# Patient Record
Sex: FEMALE | Race: BLACK OR AFRICAN AMERICAN
[De-identification: names, ages, dates, MRNs, and addresses within clinical notes are randomized per-mention and may not be internally consistent; named-entity substitution may affect disease eponyms.]

---

## 2020-01-21 ENCOUNTER — TRANSCRIPTION ENCOUNTER (OUTPATIENT)
Age: 50
End: 2020-01-21

## 2020-01-21 ENCOUNTER — EMERGENCY (EMERGENCY)
Facility: HOSPITAL | Age: 50
LOS: 1 days | Discharge: LEFT BEFORE DISCHARGE | End: 2020-01-21
Attending: EMERGENCY MEDICINE | Admitting: HOSPITALIST
Payer: COMMERCIAL

## 2020-01-21 VITALS
HEART RATE: 90 BPM | DIASTOLIC BLOOD PRESSURE: 77 MMHG | OXYGEN SATURATION: 100 % | RESPIRATION RATE: 16 BRPM | SYSTOLIC BLOOD PRESSURE: 129 MMHG | TEMPERATURE: 100 F

## 2020-01-21 VITALS
HEART RATE: 128 BPM | RESPIRATION RATE: 18 BRPM | TEMPERATURE: 100 F | DIASTOLIC BLOOD PRESSURE: 80 MMHG | SYSTOLIC BLOOD PRESSURE: 124 MMHG | OXYGEN SATURATION: 100 %

## 2020-01-21 DIAGNOSIS — Z29.9 ENCOUNTER FOR PROPHYLACTIC MEASURES, UNSPECIFIED: ICD-10-CM

## 2020-01-21 DIAGNOSIS — D32.9 BENIGN NEOPLASM OF MENINGES, UNSPECIFIED: ICD-10-CM

## 2020-01-21 DIAGNOSIS — B00.9 HERPESVIRAL INFECTION, UNSPECIFIED: ICD-10-CM

## 2020-01-21 DIAGNOSIS — R56.9 UNSPECIFIED CONVULSIONS: ICD-10-CM

## 2020-01-21 DIAGNOSIS — R65.10 SYSTEMIC INFLAMMATORY RESPONSE SYNDROME (SIRS) OF NON-INFECTIOUS ORIGIN WITHOUT ACUTE ORGAN DYSFUNCTION: ICD-10-CM

## 2020-01-21 DIAGNOSIS — Z02.9 ENCOUNTER FOR ADMINISTRATIVE EXAMINATIONS, UNSPECIFIED: ICD-10-CM

## 2020-01-21 LAB
ALBUMIN SERPL ELPH-MCNC: 4 G/DL — SIGNIFICANT CHANGE UP (ref 3.3–5)
ALP SERPL-CCNC: 55 U/L — SIGNIFICANT CHANGE UP (ref 40–120)
ALT FLD-CCNC: 13 U/L — SIGNIFICANT CHANGE UP (ref 4–33)
ANION GAP SERPL CALC-SCNC: 9 MMO/L — SIGNIFICANT CHANGE UP (ref 7–14)
APPEARANCE UR: CLEAR — SIGNIFICANT CHANGE UP
AST SERPL-CCNC: 22 U/L — SIGNIFICANT CHANGE UP (ref 4–32)
B PERT DNA SPEC QL NAA+PROBE: NOT DETECTED — SIGNIFICANT CHANGE UP
BACTERIA # UR AUTO: NEGATIVE — SIGNIFICANT CHANGE UP
BASE EXCESS BLDV CALC-SCNC: 1.5 MMOL/L — SIGNIFICANT CHANGE UP
BASOPHILS # BLD AUTO: 0.07 K/UL — SIGNIFICANT CHANGE UP (ref 0–0.2)
BASOPHILS NFR BLD AUTO: 0.8 % — SIGNIFICANT CHANGE UP (ref 0–2)
BILIRUB SERPL-MCNC: 0.3 MG/DL — SIGNIFICANT CHANGE UP (ref 0.2–1.2)
BILIRUB UR-MCNC: NEGATIVE — SIGNIFICANT CHANGE UP
BLOOD GAS VENOUS - CREATININE: 0.89 MG/DL — SIGNIFICANT CHANGE UP (ref 0.5–1.3)
BLOOD GAS VENOUS - FIO2: 21 — SIGNIFICANT CHANGE UP
BLOOD UR QL VISUAL: HIGH
BUN SERPL-MCNC: 10 MG/DL — SIGNIFICANT CHANGE UP (ref 7–23)
C PNEUM DNA SPEC QL NAA+PROBE: NOT DETECTED — SIGNIFICANT CHANGE UP
CALCIUM SERPL-MCNC: 9 MG/DL — SIGNIFICANT CHANGE UP (ref 8.4–10.5)
CHLORIDE BLDV-SCNC: 108 MMOL/L — SIGNIFICANT CHANGE UP (ref 96–108)
CHLORIDE SERPL-SCNC: 106 MMOL/L — SIGNIFICANT CHANGE UP (ref 98–107)
CO2 SERPL-SCNC: 24 MMOL/L — SIGNIFICANT CHANGE UP (ref 22–31)
COLOR SPEC: SIGNIFICANT CHANGE UP
CREAT SERPL-MCNC: 0.87 MG/DL — SIGNIFICANT CHANGE UP (ref 0.5–1.3)
EOSINOPHIL # BLD AUTO: 0.06 K/UL — SIGNIFICANT CHANGE UP (ref 0–0.5)
EOSINOPHIL NFR BLD AUTO: 0.7 % — SIGNIFICANT CHANGE UP (ref 0–6)
FLUAV H1 2009 PAND RNA SPEC QL NAA+PROBE: NOT DETECTED — SIGNIFICANT CHANGE UP
FLUAV H1 RNA SPEC QL NAA+PROBE: NOT DETECTED — SIGNIFICANT CHANGE UP
FLUAV H3 RNA SPEC QL NAA+PROBE: NOT DETECTED — SIGNIFICANT CHANGE UP
FLUAV SUBTYP SPEC NAA+PROBE: NOT DETECTED — SIGNIFICANT CHANGE UP
FLUBV RNA SPEC QL NAA+PROBE: NOT DETECTED — SIGNIFICANT CHANGE UP
GAS PNL BLDV: 139 MMOL/L — SIGNIFICANT CHANGE UP (ref 136–146)
GLUCOSE BLDV-MCNC: 97 MG/DL — SIGNIFICANT CHANGE UP (ref 70–99)
GLUCOSE SERPL-MCNC: 102 MG/DL — HIGH (ref 70–99)
GLUCOSE UR-MCNC: NEGATIVE — SIGNIFICANT CHANGE UP
HADV DNA SPEC QL NAA+PROBE: NOT DETECTED — SIGNIFICANT CHANGE UP
HCO3 BLDV-SCNC: 24 MMOL/L — SIGNIFICANT CHANGE UP (ref 20–27)
HCOV PNL SPEC NAA+PROBE: SIGNIFICANT CHANGE UP
HCT VFR BLD CALC: 38.4 % — SIGNIFICANT CHANGE UP (ref 34.5–45)
HCT VFR BLDV CALC: 40 % — SIGNIFICANT CHANGE UP (ref 34.5–45)
HGB BLD-MCNC: 12.5 G/DL — SIGNIFICANT CHANGE UP (ref 11.5–15.5)
HGB BLDV-MCNC: 13 G/DL — SIGNIFICANT CHANGE UP (ref 11.5–15.5)
HMPV RNA SPEC QL NAA+PROBE: NOT DETECTED — SIGNIFICANT CHANGE UP
HPIV1 RNA SPEC QL NAA+PROBE: NOT DETECTED — SIGNIFICANT CHANGE UP
HPIV2 RNA SPEC QL NAA+PROBE: NOT DETECTED — SIGNIFICANT CHANGE UP
HPIV3 RNA SPEC QL NAA+PROBE: NOT DETECTED — SIGNIFICANT CHANGE UP
HPIV4 RNA SPEC QL NAA+PROBE: NOT DETECTED — SIGNIFICANT CHANGE UP
IMM GRANULOCYTES NFR BLD AUTO: 0.3 % — SIGNIFICANT CHANGE UP (ref 0–1.5)
KETONES UR-MCNC: SIGNIFICANT CHANGE UP
LACTATE BLDV-MCNC: 1.5 MMOL/L — SIGNIFICANT CHANGE UP (ref 0.5–2)
LEUKOCYTE ESTERASE UR-ACNC: NEGATIVE — SIGNIFICANT CHANGE UP
LYMPHOCYTES # BLD AUTO: 0.62 K/UL — LOW (ref 1–3.3)
LYMPHOCYTES # BLD AUTO: 7.2 % — LOW (ref 13–44)
MCHC RBC-ENTMCNC: 29.8 PG — SIGNIFICANT CHANGE UP (ref 27–34)
MCHC RBC-ENTMCNC: 32.6 % — SIGNIFICANT CHANGE UP (ref 32–36)
MCV RBC AUTO: 91.6 FL — SIGNIFICANT CHANGE UP (ref 80–100)
MONOCYTES # BLD AUTO: 0.57 K/UL — SIGNIFICANT CHANGE UP (ref 0–0.9)
MONOCYTES NFR BLD AUTO: 6.6 % — SIGNIFICANT CHANGE UP (ref 2–14)
NEUTROPHILS # BLD AUTO: 7.23 K/UL — SIGNIFICANT CHANGE UP (ref 1.8–7.4)
NEUTROPHILS NFR BLD AUTO: 84.4 % — HIGH (ref 43–77)
NITRITE UR-MCNC: NEGATIVE — SIGNIFICANT CHANGE UP
NRBC # FLD: 0 K/UL — SIGNIFICANT CHANGE UP (ref 0–0)
PCO2 BLDV: 48 MMHG — SIGNIFICANT CHANGE UP (ref 41–51)
PH BLDV: 7.36 PH — SIGNIFICANT CHANGE UP (ref 7.32–7.43)
PH UR: 6 — SIGNIFICANT CHANGE UP (ref 5–8)
PLATELET # BLD AUTO: 271 K/UL — SIGNIFICANT CHANGE UP (ref 150–400)
PMV BLD: 9.7 FL — SIGNIFICANT CHANGE UP (ref 7–13)
PO2 BLDV: < 24 MMHG — LOW (ref 35–40)
POTASSIUM BLDV-SCNC: 3.3 MMOL/L — LOW (ref 3.4–4.5)
POTASSIUM SERPL-MCNC: 4 MMOL/L — SIGNIFICANT CHANGE UP (ref 3.5–5.3)
POTASSIUM SERPL-SCNC: 4 MMOL/L — SIGNIFICANT CHANGE UP (ref 3.5–5.3)
PROT SERPL-MCNC: 6.8 G/DL — SIGNIFICANT CHANGE UP (ref 6–8.3)
PROT UR-MCNC: 30 — SIGNIFICANT CHANGE UP
RBC # BLD: 4.19 M/UL — SIGNIFICANT CHANGE UP (ref 3.8–5.2)
RBC # FLD: 13.6 % — SIGNIFICANT CHANGE UP (ref 10.3–14.5)
RBC CASTS # UR COMP ASSIST: SIGNIFICANT CHANGE UP (ref 0–?)
RSV RNA SPEC QL NAA+PROBE: NOT DETECTED — SIGNIFICANT CHANGE UP
RV+EV RNA SPEC QL NAA+PROBE: NOT DETECTED — SIGNIFICANT CHANGE UP
SAO2 % BLDV: 22.6 % — LOW (ref 60–85)
SODIUM SERPL-SCNC: 139 MMOL/L — SIGNIFICANT CHANGE UP (ref 135–145)
SP GR SPEC: 1.02 — SIGNIFICANT CHANGE UP (ref 1–1.04)
SQUAMOUS # UR AUTO: SIGNIFICANT CHANGE UP
TROPONIN T, HIGH SENSITIVITY: 11 NG/L — SIGNIFICANT CHANGE UP (ref ?–14)
UROBILINOGEN FLD QL: NORMAL — SIGNIFICANT CHANGE UP
WBC # BLD: 8.58 K/UL — SIGNIFICANT CHANGE UP (ref 3.8–10.5)
WBC # FLD AUTO: 8.58 K/UL — SIGNIFICANT CHANGE UP (ref 3.8–10.5)
WBC UR QL: HIGH (ref 0–?)

## 2020-01-21 PROCEDURE — 99285 EMERGENCY DEPT VISIT HI MDM: CPT

## 2020-01-21 RX ORDER — ACYCLOVIR SODIUM 500 MG
700 VIAL (EA) INTRAVENOUS ONCE
Refills: 0 | Status: COMPLETED | OUTPATIENT
Start: 2020-01-21 | End: 2020-01-21

## 2020-01-21 RX ORDER — SODIUM CHLORIDE 9 MG/ML
1000 INJECTION, SOLUTION INTRAVENOUS
Refills: 0 | Status: DISCONTINUED | OUTPATIENT
Start: 2020-01-21 | End: 2020-01-21

## 2020-01-21 RX ORDER — MORPHINE SULFATE 50 MG/1
2 CAPSULE, EXTENDED RELEASE ORAL ONCE
Refills: 0 | Status: DISCONTINUED | OUTPATIENT
Start: 2020-01-21 | End: 2020-01-21

## 2020-01-21 RX ORDER — ACETAMINOPHEN 500 MG
650 TABLET ORAL EVERY 6 HOURS
Refills: 0 | Status: DISCONTINUED | OUTPATIENT
Start: 2020-01-21 | End: 2020-01-21

## 2020-01-21 RX ORDER — ACYCLOVIR SODIUM 500 MG
850 VIAL (EA) INTRAVENOUS EVERY 8 HOURS
Refills: 0 | Status: DISCONTINUED | OUTPATIENT
Start: 2020-01-21 | End: 2020-01-21

## 2020-01-21 RX ORDER — ACYCLOVIR SODIUM 500 MG
700 VIAL (EA) INTRAVENOUS EVERY 8 HOURS
Refills: 0 | Status: DISCONTINUED | OUTPATIENT
Start: 2020-01-21 | End: 2020-01-21

## 2020-01-21 RX ORDER — ACYCLOVIR SODIUM 500 MG
100 VIAL (EA) INTRAVENOUS ONCE
Refills: 0 | Status: DISCONTINUED | OUTPATIENT
Start: 2020-01-21 | End: 2020-01-21

## 2020-01-21 RX ORDER — SODIUM CHLORIDE 9 MG/ML
1000 INJECTION, SOLUTION INTRAVENOUS ONCE
Refills: 0 | Status: COMPLETED | OUTPATIENT
Start: 2020-01-21 | End: 2020-01-21

## 2020-01-21 RX ORDER — LEVETIRACETAM 250 MG/1
500 TABLET, FILM COATED ORAL
Refills: 0 | Status: DISCONTINUED | OUTPATIENT
Start: 2020-01-21 | End: 2020-01-21

## 2020-01-21 RX ADMIN — Medication 650 MILLIGRAM(S): at 10:00

## 2020-01-21 RX ADMIN — MORPHINE SULFATE 2 MILLIGRAM(S): 50 CAPSULE, EXTENDED RELEASE ORAL at 06:20

## 2020-01-21 RX ADMIN — LEVETIRACETAM 500 MILLIGRAM(S): 250 TABLET, FILM COATED ORAL at 19:24

## 2020-01-21 RX ADMIN — SODIUM CHLORIDE 100 MILLILITER(S): 9 INJECTION, SOLUTION INTRAVENOUS at 19:24

## 2020-01-21 RX ADMIN — SODIUM CHLORIDE 100 MILLILITER(S): 9 INJECTION, SOLUTION INTRAVENOUS at 12:19

## 2020-01-21 RX ADMIN — Medication 650 MILLIGRAM(S): at 17:05

## 2020-01-21 RX ADMIN — SODIUM CHLORIDE 1000 MILLILITER(S): 9 INJECTION, SOLUTION INTRAVENOUS at 05:35

## 2020-01-21 RX ADMIN — SODIUM CHLORIDE 1000 MILLILITER(S): 9 INJECTION, SOLUTION INTRAVENOUS at 06:20

## 2020-01-21 RX ADMIN — Medication 176 MILLIGRAM(S): at 19:24

## 2020-01-21 RX ADMIN — Medication 650 MILLIGRAM(S): at 11:30

## 2020-01-21 RX ADMIN — Medication 650 MILLIGRAM(S): at 16:06

## 2020-01-21 RX ADMIN — Medication 264 MILLIGRAM(S): at 10:01

## 2020-01-21 NOTE — H&P ADULT - HISTORY OF PRESENT ILLNESS
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 Patient states that she was speaking with her daughter when she experience an episode of dysarthria followed by LOC. The event was witnessed by er daughter who stated that upon LOC patient experienced whole body shaking which lasted about 2 minutes. Patient was brought to ER after this event. Patient has had no prior episodes of seizure. Of note, patient's meningioma was resected in OCT 2018 (by Dr. Mcdaniel of [facility]) and was told that 5% was remaining. Patient has had annual MRIs since and was told that her most recent in July 2019 was normal. Additionally, patient has been experiencing a sore throat since Monday night associated w/ fatigue. +recent travel to [place] in Nov. Patient does not report f/c, n/v, headaches, b/v, neck stiffness, photophobia, sick contacts. 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 Patient states that she was speaking with her daughter when she experience an episode of dysarthria followed by LOC. The event was witnessed by er daughter who stated that upon LOC patient experienced whole body shaking which lasted about 2 minutes. Patient was brought to ER after this event. Patient has had no prior episodes of seizure. Of note, patient's meningioma was resected in OCT 2018 (by Dr. Mcdaniel of [facility]) and was told that 5% was remaining. Patient has had annual MRIs since and was told that her most recent in July 2019 was normal. Additionally, patient has been experiencing a sore throat since Monday night associated w/ headache fatigue. +recent travel to [place] in Nov. Patient does not report f/c, n/v, headaches, b/v, neck stiffness, photophobia, sick contacts.

## 2020-01-21 NOTE — CONSULT NOTE ADULT - ATTENDING COMMENTS
49 year old female with reported history of HSV, prior meningoma resection, presents with seizure.  Found to be febrile in the ED.     At this time, she is non- toxic appearing.     There are two possibilities I am concerned about.  The patient may have a focus for seizure (CNS lesion noted on imaging) that provoked seizure and pt had fever in the post ictal period.  Given her current mental state, a and o times three- this seems more likely.     But seizure with fever does raise concern for CNS infection- including HSV.   I think this is less likely , but the danger of menigitis/ HSV encephalitis is significant.    I would recommend continuing acyclovir with close monitoring of Cr/ IV fluids.     I would pursue an LP with cell count , glucose, protein, routine culture, CSF PCR panel, HSV 1/2 PCR.

## 2020-01-21 NOTE — H&P ADULT - PROBLEM SELECTOR PLAN 1
1st episode of seizure x 2min likely due to progressing meningioma v  Neuro c/s, recs appreciated;   -MRI Brain w/ & w/o contrast  -24hr vEEG  -Keppra 500mg BID  -c/w acyclovir  - f/u HIV, blood cx, Ucx  - LP 1st episode of seizure x 2min likely due to progressing meningioma v  Neuro c/s, recs appreciated;   -MRI Brain w/ & w/o contrast  -24hr vEEG  -Keppra 500mg BID  -c/w acyclovir  - f/u HIV, blood cx, Ucx  -LP if patient amenable

## 2020-01-21 NOTE — H&P ADULT - NSHPLABSRESULTS_GEN_ALL_CORE
.  LABS:                         12.5   8.58  )-----------( 271      ( 2020 03:14 )             38.4     -    139  |  106  |  10  ----------------------------<  102<H>  4.0   |  24  |  0.87    Ca    9.0      2020 03:14    TPro  6.8  /  Alb  4.0  /  TBili  0.3  /  DBili  x   /  AST  22  /  ALT  13  /  AlkPhos  55        Urinalysis Basic - ( 2020 04:30 )    Color: LIGHT YELLOW / Appearance: CLEAR / S.020 / pH: 6.0  Gluc: NEGATIVE / Ketone: TRACE  / Bili: NEGATIVE / Urobili: NORMAL   Blood: MODERATE / Protein: 30 / Nitrite: NEGATIVE   Leuk Esterase: NEGATIVE / RBC: 30-50 / WBC 11-25   Sq Epi: OCC / Non Sq Epi: x / Bacteria: NEGATIVE        CT Head:     IMPRESSION:   A left parietal meningioma measures 3.8 x 2.3 x 5 cm.  There is small amount of edema in the adjacent left parietal convexity.  No evidence of acute intracranial hemorrhage, midline shift, hydrocephalus or acute territorial infarct.    Follow-up MRI may be obtained for further evaluation.

## 2020-01-21 NOTE — ED ADULT NURSE REASSESSMENT NOTE - NS ED NURSE REASSESS COMMENT FT1
Pt requesting to leave against medical advice at this time. team 3 called and made aware. Will continue to monitor.

## 2020-01-21 NOTE — ED PROVIDER NOTE - PHYSICAL EXAMINATION
GENERAL: no acute distress, non-toxic appearing  HEAD: normocephalic, atraumatic  HEENT: normal conjunctiva, oral mucosa moist, neck supple, tongue abrasions lateral aspects of tongue not currently bleeding  CARDIAC: regular rate and rhythm, normal S1 and S2,  no appreciable murmurs, 2+ pulses all extremities  PULM: clear to ascultation bilaterally, no crackles, rales, rhonchi, or wheezing  GI: abdomen nondistended, soft, nontender, no guarding or rebound tenderness  : no CVA tenderness, no suprapubic tenderness  NEURO: alert and oriented x 3, normal speech, CN3-12 intact, PERRL, EOMI, no pronator drift, finger to nose intact, no focal motor or sensory deficits, nonantalgic gait  MSK: no visible deformities, no peripheral edema, calf tenderness/redness/swelling  SKIN: no visible rashes, dry, well-perfused  PSYCH: appropriate mood and affect

## 2020-01-21 NOTE — H&P ADULT - PROBLEM SELECTOR PLAN 2
obtain previous scans from [place] Menigioma partially resected in 2018 w/ 3.8 x 2.3 x 5 cm L parietal lobe mengioma seen no 1/21 CTH  - Will obtain records from [facility]  - neurosurg c/s  - poss transfer to [facility] for tx from patients neurosurgeon Dr. Darrell Bolton

## 2020-01-21 NOTE — DISCHARGE NOTE PROVIDER - HOSPITAL COURSE
48 y/o F PMH w/ Mhx of meningioma s/p resection in 2018 w/ repeat CTH showing left parietal meningioma p/w seizure likely due to increasing size of meningioma v viral encephalitis v aseptic meningitis. CT head was performed in the ED showing a left parietal meningioma. As per patient, she gets routine MRIs to monitor mass and according to patient, the last MRI was "normal." An LP was performed in the ED as well but was unsuccessful. An attempt was made to contact neurosurgeon Dr. Mcdaniel of [facility] [facility], but there was no answer. Patient requesting to be transferred to [place] which was being put in place. However, patient decided to leave [place] due to the wait time for transfer.

## 2020-01-21 NOTE — PHARMACOTHERAPY INTERVENTION NOTE - COMMENTS
CUCAROSA JENSEN, 49y Female being treated empirically for possible viral encephalitis/meningitis, on acyclovir IV.    Recommendation(s):  1) Patient is overweight (BMI is 29.9), would recommend changing dose of acyclovir based on adjusted body weight (70 kg), given 10 mg/kg Q8H (700 mg IV Q8H).  2) Ensure patient is receiving adequate fluids as acyclovir can cause nephrotoxicity without fluid administration.     [place], JacquesD  PGY-2 Infectious Diseases Pharmacy Resident  Spectra [phone]  .

## 2020-01-21 NOTE — ED PROVIDER NOTE - ATTENDING CONTRIBUTION TO CARE
I performed a face to face bedside interview with patient regarding history of present illness, review of symptoms and past medical history. I completed an independent physical exam.  I have discussed patient's plan of care.   I agree with note as stated above, having amended the EMR as needed to reflect my findings. I have discussed the assessment and plan of care.  This includes during the time I functioned as the attending physician for this patient.  Attending Contribution to Care: agree with plan of resident. 49F PMH benign brain tumor s/p resection 2018 presenting to the ED with family after a witnessed approx. 2 minute episode of generalized shaking, along with saliva coming from pts mouth as she was laying down in bed at approx 12am this evening. Pt states this has never happened to her before. Daughter of patient also states that pt came to.  pt p/w headache, 1 episode of seizure like activity witnessed by family with post ictal phase, with fever 101.6 here. pt well appearing, overall with no nuchal rigidity. difficult to access viral vs bacterial, most likely viral. pending LP. I performed a face to face bedside interview with patient regarding history of present illness, review of symptoms and past medical history. I completed an independent physical exam.  I have discussed patient's plan of care.   I agree with note as stated above, having amended the EMR as needed to reflect my findings. I have discussed the assessment and plan of care.  This includes during the time I functioned as the attending physician for this patient.  Attending Contribution to Care: agree with plan of resident. 49F PMH benign brain tumor s/p resection 2018 presenting to the ED with family after a witnessed approx. 2 minute episode of generalized shaking, along with saliva coming from pts mouth as she was laying down in bed at approx 12am this evening. Pt states this has never happened to her before. Daughter of patient also states that pt came to.  pt p/w headache, 1 episode of seizure like activity witnessed by family with post ictal phase, with fever 101.6 here. pt well appearing, overall with no nuchal rigidity. difficult to access viral vs bacterial, most likely viral. pending LP. stable at this point

## 2020-01-21 NOTE — ED ADULT NURSE REASSESSMENT NOTE - NS ED NURSE REASSESS COMMENT FT1
Pt requesting to be transferred to [facility] where her neurosurgeon doctor is associated. Team 3 made aware and given number for the doctors office. Will continue to monitor.

## 2020-01-21 NOTE — ED ADULT TRIAGE NOTE - CHIEF COMPLAINT QUOTE
PT is postictal from home were she had a witnessed seizure that was about 2-3 minutes in length. PT is confused and has HX of brain tumor surgery in OCT. of 2018. PT appears confortable and aware

## 2020-01-21 NOTE — DISCHARGE NOTE PROVIDER - NSDCCPCAREPLAN_GEN_ALL_CORE_FT
PRINCIPAL DISCHARGE DIAGNOSIS  Diagnosis: Seizure  Assessment and Plan of Treatment: You have been seen and evaluated for seizure. A CT head was performed and showed a menigioma which is likely the cause of your seizure. However, there remains other causes of seizure including an infection of the fluid surrounding your brain due melody viral or bacterial organism. The infectious disease and neuro doctors have evaluated you and deemed that you need to be evaluated further with more lab tests including a lumbar puncture, but due to your wishes to be treated at [facility], you will be discharged to follow up with your neurosurgeon Dr. Alex at [place].      SECONDARY DISCHARGE DIAGNOSES  Diagnosis: Meningioma  Assessment and Plan of Treatment: A CT head was performed and showed a mengioma. Please make sure to follow up with your neurosurgeon Dr. Alex at [place].

## 2020-01-21 NOTE — H&P ADULT - NSHPPHYSICALEXAM_GEN_ALL_CORE
General appearance: NAD, conversant, afebrile    Eyes: anicteric sclerae, moist conjunctivae; no lid-lag; Pupils equal, round and reactive to light; Extraocular muscles intact   HENT: Atraumatic; oropharynx clear with moist mucous membranes    Neck: Trachea midline; Full range of motion, supple   Pulm: Lungs clear to auscultation bilaterally, with normal respiratory effort and no intercostal retractions; normal work of breathing   CV: Regular Rhythm and Rate; Normal S1, S2; No murmurs, rubs, or gallops   Abdomen: Soft, non-tender, non-distended   Extremities: No peripheral edema. 5/5 strength in all four extremities.  Neuro: CN II-XII grossly intact, sensation intact in all extremities, non focal   Psych: Appropriate affect, cooperative; alert and oriented to person, place and time

## 2020-01-21 NOTE — CONSULT NOTE ADULT - ASSESSMENT
50 y/o female with past medical history of Partial meningioma resection (2018) presents to the ED for reported seizure. Patient states she "did not feel well" on 1/20 and reported feeling URI symptoms (cough, sore throat). On 1/20 night, patient reportedly tried speaking to her daughter but was having difficulty getting out her words. Patient last remembers going to bed at 12am on 1/21 and then remembered waking up in the ambulance. Patient admits to having blood in her mouth at that time. As per EMR, patient had 2 minutes of generalized shaking accompanied with unresponsiveness and saliva coming out of her mouth while laying in bed followed by post-ictal confusion for about 1 hour. Patient denies any recent head trauma. No prior history of seizures and reports taking AED for 1 week when first diagnosed with meningioma in 10/2018. Patient reports she has been following with Doctors at [facility] since her diagnosis and recently had a stable MRI brain.   At the time of my interview, patient denies any changes in vision, ringing in the ears, double vision, unilateral weakness or sensory changes, problems with speech or coordination. Neurologic exam demonstrates +nuchal rigidity otherwise non-focal.   In the ED patient was noted to have rectal temperature of 101.2F w/o leukocytosis. UA grossly negative. RapidVP negative.   CTH demonstrating left parietal 3.8x2.3x5cm meningioma w/ small amount of edema adjacent to left parietal convexity.     Impression: Suspect generalized seizure 2/2 unknown etiology; ?expanding meningioma a/w edema may cause local irritation to brain parenchyma which may have triggered seizure; CNS infection (encephalitis) is a possibility given seizure, fever, and nuchal rigidity, though patient appears non-toxic on examination; would r/o alternate toxic/metabolic etiologies.      Recommendations:   [] MRI Brain w/ & w/o contrast  [] 24hr vEEG  [] Start Keppra 500mg BID  [] Agree with empiric treatment for HSV  [] HIV testing  [] Neurosurgery consultation  [] Infectious Disease consultation   [] Would defer LP until MRI; will decide if needed once imaging obtained  [] Obtain records from [facility] 50 y/o female with past medical history of Partial meningioma resection (2018) presents to the ED for reported seizure. Patient states she "did not feel well" on 1/20 and reported feeling URI symptoms (cough, sore throat). On 1/20 night, patient reportedly tried speaking to her daughter but was having difficulty getting out her words. Patient last remembers going to bed at 12am on 1/21 and then remembered waking up in the ambulance. Patient admits to having blood in her mouth at that time. As per EMR, patient had 2 minutes of generalized shaking accompanied with unresponsiveness and saliva coming out of her mouth while laying in bed followed by post-ictal confusion for about 1 hour. Patient denies any recent head trauma. No prior history of seizures and reports taking AED for 1 week when first diagnosed with meningioma in 10/2018. Patient reports she has been following with Doctors at [facility] since her diagnosis and recently had a stable MRI brain.   At the time of my interview, patient denies any changes in vision, ringing in the ears, double vision, unilateral weakness or sensory changes, problems with speech or coordination. Neurologic exam demonstrates +nuchal rigidity otherwise non-focal.   In the ED patient was noted to have rectal temperature of 101.2F w/o leukocytosis. UA grossly negative. RapidVP negative.   CTH demonstrating left parietal 3.8x2.3x5cm meningioma w/ small amount of edema adjacent to left parietal convexity.     Impression: Suspect generalized seizure 2/2 unknown etiology -- likely provoked though with high risk of recurrence warranting treatment; ?expanding meningioma a/w edema may cause local irritation to brain parenchyma which may have triggered seizure; CNS infection (encephalitis) is a possibility given seizure, fever, and nuchal rigidity, though patient appears non-toxic on examination; would r/o alternate toxic/metabolic etiologies.      Recommendations:   [] MRI Brain w/ & w/o contrast  [] 24hr vEEG  [] Start Keppra 500mg BID  [] Agree with empiric treatment for HSV  [] HIV testing  [] Neurosurgery consultation  [] Infectious Disease consultation   [] Would defer LP until MRI; will decide if needed once imaging obtained  [] Obtain records from [facility]

## 2020-01-21 NOTE — CONSULT NOTE ADULT - ASSESSMENT
48 y/o F PMH of HSV and meningioma s/p resection in 2018 w/ repeat CTH showing left parietal meningioma p/w seizure, ID consulted to evaluate for possible infectious cause, r/o viral encephalitis vs aseptic meningitis.    On the infectious differential for this patient's new onset seizures with fever, tachycardia, nuchal rigidity are: HSV encephalitis,     Recommend:  - c/w IV acyclovir with IVF hydration  - antiseizure with keppra per primary team/neurology, and vEEG  - f/u BCx, UCx  - HSV, VZV pcr, HIV test  - MRI brain to evaluate for encephalitis  - trend CBC with diff daily and temperatures 48 y/o F PMH of HSV and meningioma s/p resection in 2018 w/ repeat CTH showing left parietal meningioma p/w seizure, ID consulted to evaluate for possible infectious cause, r/o viral encephalitis vs aseptic meningitis.    On the infectious differential for this patient's new onset seizures with fever, tachycardia, nuchal rigidity are: HSV encephalitis,     Recommend:  - c/w IV acyclovir with IVF hydration  - repeat LP, though patient refusing to reattempt in ED here, wishes to go to [facility].  - antiseizure with keppra per primary team/neurology, and vEEG  - f/u BCx, UCx  - HSV, VZV pcr, HIV test  - MRI brain to evaluate for encephalitis  - trend CBC with diff daily and temperatures 50 y/o F PMH of HSV and meningioma s/p resection in 2018 w/ repeat CTH showing left parietal meningioma p/w seizure, ID consulted to evaluate for possible infectious cause, r/o viral encephalitis vs aseptic meningitis.    On the infectious differential for this patient's new onset seizures with fever, tachycardia, nuchal rigidity are: HSV encephalitis/meningitis vs progression of meningioma.     Recommend:  - c/w IV acyclovir with IVF hydration  - repeat LP, though patient refusing to reattempt in ED here; would recommend IR guided LP, send for CSF cell count/glucose/protein/HSV/viral pcr/gram stain/opening pressure  - c/w antiseizure with keppra per primary team/neurology, and vEEG  - f/u BCx, UCx  - HSV, VZV pcr, HIV test (serum)  - MRI brain to evaluate for encephalitis  - trend CBC with diff daily and temperatures

## 2020-01-21 NOTE — H&P ADULT - PROBLEM SELECTOR PLAN 4
Obtained from , no confirmatory tests available. Patient states she rarely gets outbreaks and takes valtrex sporadically  -c/w acyclovir  -will cont to monitor

## 2020-01-21 NOTE — H&P ADULT - PROBLEM SELECTOR PLAN 6
1.  Name of PCP: Dr. Marta Yusuf  2.  PCP Contacted on Admission: [ ] Y    [ ] N    3.  PCP contacted at Discharge: [ ] Y    [ ] N    [ ] N/A  4.  Post-Discharge Appointment Date and Location:  5.  Summary of Handoff given to PCP: Dispo: poss transfer to [facility] for neurosurgical intevention    1.  Name of PCP: Dr. Marta Yusuf  2.  PCP Contacted on Admission: [ ] Y    [ ] N    3.  PCP contacted at Discharge: [ ] Y    [ ] N    [ ] N/A  4.  Post-Discharge Appointment Date and Location:  5.  Summary of Handoff given to PCP:

## 2020-01-21 NOTE — ED PROVIDER NOTE - OBJECTIVE STATEMENT
49F PMH benign brain tumor s/p resection 2018 presenting to the ED with family after a witnessed approx. 2 minute episode of generalized shaking, along with saliva coming from pts mouth as she was laying down in bed at approx 12am this evening. Pt states this has never happened to her before. Daughter of patient also states that pt came to. 49F PMH benign brain tumor s/p resection 2018, HSV1 and HSV2 presenting to the ED with family after a witnessed approx. 2 minute episode of generalized shaking, along with saliva coming from pts mouth as she was laying down in bed at approx 12am this evening. Pt states this has never happened to her before. No head trauma recently. Patient denies fevers, chills, blurry vision, weakness in extremity or sensory deficits, chest pain, palpitations, sob, abd pain, n/v/d, urinary sxs, leg swelling.

## 2020-01-21 NOTE — H&P ADULT - ATTENDING COMMENTS
Fever & seizure ?2/2 expanding meningioma w/ small area of edema  appreciate Neuro eval - will obtain MRI   NSx c/s  Received acyclovir in ED for empiric coverage of HSV encephalitis, less likely given no AMS, will continue for now awaiting ID eval  Pt wishes to be transferred to [facility] under the care of her neurosurgeon - will f/u

## 2020-01-21 NOTE — H&P ADULT - PROBLEM SELECTOR PLAN 3
Patient initially febrile to 101 and tachycardic fulfilling SIRS criteria w/ no source found  - RVP, UA negative  - f/u blood/urine cx  - c/w acyclovir  - IVF

## 2020-01-21 NOTE — H&P ADULT - ASSESSMENT
50 y/o F PMH w/ Mhx of menigioma s/p resection in 2018 showing 50 y/o F PMH w/ Mhx of menigioma s/p resection in 2018 w/ repeat CTH showing left parietal mengioma p/w seizure likely due to increasing size of meningioma v viral encephalitis v aseptic meningitis

## 2020-01-21 NOTE — ED ADULT NURSE NOTE - ED STAT RN HANDOFF DETAILS
Report given to primary RN Citlaly Causey. Report given to primary RN Citlaly Causey. Primary RN aware only CBC and CMP were sent.

## 2020-01-21 NOTE — CONSULT NOTE ADULT - SUBJECTIVE AND OBJECTIVE BOX
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 Patient states that she was speaking with her daughter when she experience an episode of dysarthria followed by LOC. The event was witnessed by daughter who stated that upon LOC patient experienced whole body shaking which lasted about 2 minutes. Patient was brought to ER after this event. Patient has had no prior episodes of seizure. Of note, patient's meningioma was resected in OCT 2018 (by Dr. Mcdaniel of [facility]) and was told that 5% was remaining. Patient has had annual MRIs since and was told that her most recent in 2019 was normal.     Of note, patient has been experiencing a sore throat and cough since Monday night () associated w/ headache fatigue. +recent travel to [place] in Nov. Patient does not report f/c, n/v, headaches, b/v, neck stiffness, photophobia, sick contacts. Previous episodes of HSV often present as ***, last episode was on ***.     In the ED, VS notable for Tmax (rectal) of 101.2 and tachycardic to 123; no leukocytosis; UA +pyruria and moderate blood; RVP negative. CTH with residual L parietal 3.8x2.3x5cm meningioma with small edema. LP was unsuccessful bedside, and patient now refusing LP and requesting transfer to [facility].     ID consulted for evaluation of possible meningitis vs encephalitis vs progression of meningioma in setting of new seizure this morning.       prior hospital charts reviewed [ x ]  primary team notes reviewed [x  ]  other consultant notes reviewed [ x ]    PAST MEDICAL & SURGICAL HISTORY:  S/P resection of meningioma  HSV infection    Allergies  No Known Allergies      ANTIMICROBIALS (past 90 days)  MEDICATIONS  (STANDING):    acyclovir IVPB   264 mL/Hr IV Intermittent (20 @ 10:01)      ANTIMICROBIALS:    acyclovir IVPB 850 every 8 hours    OTHER MEDS: MEDICATIONS  (STANDING):  acetaminophen   Tablet .. 650 every 6 hours PRN  levETIRAcetam 500 two times a day      SOCIAL HISTORY:   hx smoking / non-smoker  FAMILY HISTORY:    REVIEW OF SYSTEMS  [  ] ROS unobtainable because:    [ x ] All other systems negative except as noted below:	    Constitutional:  [ ] fever [ ] chills  [ ] weight loss  [ ] weakness  Skin:  [ ] rash [ ] phlebitis	  Eyes: [ ] icterus [ ] pain  [ ] discharge	  ENMT: [ ] sore throat  [ ] thrush [ ] ulcers [ ] exudates  Respiratory: [ ] dyspnea [ ] hemoptysis [ ] cough [ ] sputum	  Cardiovascular:  [ ] chest pain [ ] palpitations [ ] edema	  Gastrointestinal:  [ ] nausea [ ] vomiting [ ] diarrhea [ ] constipation [ ] pain	  Genitourinary:  [ ] dysuria [ ] frequency [ ] hematuria [ ] discharge [ ] flank pain  [ ] incontinence  Musculoskeletal:  [ ] myalgias [ ] arthralgias [ ] arthritis  [ ] back pain  Neurological:  [ ] headache [ ] seizures  [ ] confusion/altered mental status  Psychiatric:  [ ] anxiety [ ] depression	  Hematology/Lymphatics:  [ ] lymphadenopathy  Endocrine:  [ ] adrenal [ ] thyroid  Allergic/Immunologic:	 [ ] transplant [ ] seasonal    Vital Signs Last 24 Hrs  T(F): 99.7 (20 @ 11:43), Max: 101.2 (20 @ 04:33)  Vital Signs Last 24 Hrs  HR: 99 (20 @ 11:43) (93 - 128)  BP: 123/72 (20 @ 11:43) (123/72 - 143/72)  RR: 16 (20 @ 11:43)  SpO2: 100% (20 @ 11:43) (98% - 100%)  Wt(kg): --    PHYSICAL EXAM:  Constitutional: non-toxic, no distress, awake  HEAD/EYES: atraumatic, anicteric, no conjunctival injection  ENT:  supple, no sores, no thrush  Cardiovascular:   normal S1, S2, no murmur, no edema  Respiratory:  equal breath sounds, no wheezes, no rales  GI:  soft, non-tender, normal bowel sounds  :  no gonzalez, no CVA tenderness  Musculoskeletal:  no synovitis, normal ROM  Neurologic: awake and alert,  normal strength, no focal findings  Skin:  no rash, no erythema, no phlebitis  Heme/Onc: no lymphadenopathy   Psychiatric:  awake, alert, appropriate mood      LABS:                        12.5   8.58  )-----------( 271      ( 2020 03:14 )             38.4         139  |  106  |  10  ----------------------------<  102<H>  4.0   |  24  |  0.87    Ca    9.0      2020 03:14    TPro  6.8  /  Alb  4.0  /  TBili  0.3  /  DBili  x   /  AST  22  /  ALT  13  /  AlkPhos  55      Urinalysis Basic - ( 2020 04:30 )    Color: LIGHT YELLOW / Appearance: CLEAR / S.020 / pH: 6.0  Gluc: NEGATIVE / Ketone: TRACE  / Bili: NEGATIVE / Urobili: NORMAL   Blood: MODERATE / Protein: 30 / Nitrite: NEGATIVE   Leuk Esterase: NEGATIVE / RBC: 30-50 / WBC 11-25   Sq Epi: OCC / Non Sq Epi: x / Bacteria: NEGATIVE      MICROBIOLOGY:        RADIOLOGY:  imaging below personally reviewed  < from: CT Head No Cont (20 @ 03:33) >  FINDINGS:   The patient is status post left parietal craniotomy.  Along the inner margin of the bone flap there is a 3.8 x 2.3 x 5 cm hyperattenuating extra-axial mass along the left parietal convexity, compatible with a meningioma.  There is small amount of adjacent edema in the left parietal convexity.  There is no CT evidence of acute intracranial hemorrhage, midline shift or hydrocephalus.  Gray matter-white matter differentiation is grossly preserved.     The ventricles, sulci and extra-axial spaces appear within normal limits.     The visualized paranasal sinuses are clear.    The mastoid air cells and middle ear cavities are clear.    There is no calvarial or skull base fracture.    CRITICAL VALUE: I discussed the major findings in this report with  Dr. Pena on 2020 at 3:48 AM.  Critical value policy of the hospital was followed. Read back and confirmation of receipt of this communication was performed. This verbal communication supplements the text report of this document.     IMPRESSION:   A left parietal meningioma measures 3.8 x 2.3 x 5 cm.  There is small amount of edema in the adjacent left parietal convexity.  No evidence of acute intracranial hemorrhage, midline shift, hydrocephalus or acute territorial infarct.    Follow-up MRI may be obtained for further evaluation.    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 Patient states that she was speaking with her daughter when she experience an episode of dysarthria followed by LOC. The event was witnessed by daughter who stated that upon LOC patient experienced whole body shaking which lasted about 2 minutes. Patient was brought to ER after this event. Patient has had no prior episodes of seizure. Of note, patient's meningioma was resected in OCT 2018 (by Dr. Mcdaniel of [facility]) and was told that 5% was remaining. Patient has had annual MRIs since and was told that her most recent in 2019 was normal.     Of note, patient has been experiencing a sore throat and cough since Monday night () associated w/ headache fatigue. +recent travel to [place] in Nov. Patient does not report f/c, n/v, headaches, b/v, neck stiffness, photophobia, sick contacts. Previous episodes of HSV "from a long time ago"- patient did not want to discuss this in front of fiance and in hallway.     In the ED, VS notable for Tmax (rectal) of 101.2 and tachycardic to 123; no leukocytosis; UA +pyruria and moderate blood; RVP negative. CTH with residual L parietal 3.8x2.3x5cm meningioma with small edema. LP was unsuccessful bedside, and patient now refusing LP and requesting transfer to [facility] vs leaving AMA.     ID consulted for evaluation of possible meningitis vs encephalitis vs progression of meningioma in setting of new seizure this morning.     ROS negative for rash, vesicles, diarrhea, focal neurologic deficits; still has headache today, some n/v this morning, no photophobia.    prior hospital charts reviewed [ x ]  primary team notes reviewed [x  ]  other consultant notes reviewed [ x ]    PAST MEDICAL & SURGICAL HISTORY:  S/P resection of meningioma  HSV infection    Allergies  No Known Allergies      ANTIMICROBIALS (past 90 days)  MEDICATIONS  (STANDING):    acyclovir IVPB   264 mL/Hr IV Intermittent (20 @ 10:01)      ANTIMICROBIALS:    acyclovir IVPB 850 every 8 hours    OTHER MEDS: MEDICATIONS  (STANDING):  acetaminophen   Tablet .. 650 every 6 hours PRN  levETIRAcetam 500 two times a day      SOCIAL HISTORY:   hx smoking / non-smoker, lives with adult daughter, recent travel to [place], works as .  FAMILY HISTORY: n/a    REVIEW OF SYSTEMS  [  ] ROS unobtainable because:    [ x ] All other systems negative except as noted below:	    Constitutional:  [x ] fever [ ] chills  [ ] weight loss  [ ] weakness  Skin:  [ ] rash [ ] phlebitis	  Eyes: [ ] icterus [ ] pain  [ ] discharge	  ENMT: [x ] sore throat  [ ] thrush [ ] ulcers [ ] exudates  Respiratory: [ ] dyspnea [ ] hemoptysis [ ] cough [ ] sputum	  Cardiovascular:  [ ] chest pain [ ] palpitations [ ] edema	  Gastrointestinal:  [ ] nausea [ ] vomiting [ ] diarrhea [ ] constipation [ ] pain	  Genitourinary:  [ ] dysuria [ ] frequency [ ] hematuria [ ] discharge [ ] flank pain  [ ] incontinence  Musculoskeletal:  [ ] myalgias [ ] arthralgias [ ] arthritis  [ ] back pain  Neurological:  [ x] headache [ ] seizures  [ ] confusion/altered mental status  Psychiatric:  [ ] anxiety [ ] depression	  Hematology/Lymphatics:  [ ] lymphadenopathy  Endocrine:  [ ] adrenal [ ] thyroid  Allergic/Immunologic:	 [ ] transplant [ ] seasonal    Vital Signs Last 24 Hrs  T(F): 99.7 (20 @ 11:43), Max: 101.2 (20 @ 04:33)  Vital Signs Last 24 Hrs  HR: 99 (20 @ 11:43) (93 - 128)  BP: 123/72 (20 @ 11:43) (123/72 - 143/72)  RR: 16 (20 @ 11:43)  SpO2: 100% (20 @ 11:43) (98% - 100%)  Wt(kg): --    PHYSICAL EXAM:  Constitutional: non-toxic, no distress, awake  HEAD/EYES: atraumatic, anicteric, no conjunctival injection  ENT:  supple, no sores, no thrush  Cardiovascular:   normal S1, S2, no murmur, no edema  Respiratory:  equal breath sounds, no wheezes, no rales  GI:  soft, non-tender, normal bowel sounds  :  no gonzalez, no CVA tenderness  Musculoskeletal:  no synovitis, normal ROM  Neurologic: awake and alert,  normal strength, no focal findings  Skin:  no rash, no erythema, no phlebitis  Heme/Onc: no lymphadenopathy   Psychiatric:  awake, alert, appropriate mood      LABS:                        12.5   8.58  )-----------( 271      ( 2020 03:14 )             38.4         139  |  106  |  10  ----------------------------<  102<H>  4.0   |  24  |  0.87    Ca    9.0      2020 03:14    TPro  6.8  /  Alb  4.0  /  TBili  0.3  /  DBili  x   /  AST  22  /  ALT  13  /  AlkPhos  55      Urinalysis Basic - ( 2020 04:30 )    Color: LIGHT YELLOW / Appearance: CLEAR / S.020 / pH: 6.0  Gluc: NEGATIVE / Ketone: TRACE  / Bili: NEGATIVE / Urobili: NORMAL   Blood: MODERATE / Protein: 30 / Nitrite: NEGATIVE   Leuk Esterase: NEGATIVE / RBC: 30-50 / WBC 11-25   Sq Epi: OCC / Non Sq Epi: x / Bacteria: NEGATIVE      MICROBIOLOGY:        RADIOLOGY:  imaging below personally reviewed  < from: CT Head No Cont (20 @ 03:33) >  FINDINGS:   The patient is status post left parietal craniotomy.  Along the inner margin of the bone flap there is a 3.8 x 2.3 x 5 cm hyperattenuating extra-axial mass along the left parietal convexity, compatible with a meningioma.  There is small amount of adjacent edema in the left parietal convexity.  There is no CT evidence of acute intracranial hemorrhage, midline shift or hydrocephalus.  Gray matter-white matter differentiation is grossly preserved.     The ventricles, sulci and extra-axial spaces appear within normal limits.     The visualized paranasal sinuses are clear.    The mastoid air cells and middle ear cavities are clear.    There is no calvarial or skull base fracture.    CRITICAL VALUE: I discussed the major findings in this report with  Dr. Pena on 2020 at 3:48 AM.  Critical value policy of the hospital was followed. Read back and confirmation of receipt of this communication was performed. This verbal communication supplements the text report of this document.     IMPRESSION:   A left parietal meningioma measures 3.8 x 2.3 x 5 cm.  There is small amount of edema in the adjacent left parietal convexity.  No evidence of acute intracranial hemorrhage, midline shift, hydrocephalus or acute territorial infarct.    Follow-up MRI may be obtained for further evaluation.    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 Patient states that she was speaking with her daughter when she experience an episode of dysarthria followed by LOC. The event was witnessed by daughter who stated that upon LOC patient experienced whole body shaking which lasted about 2 minutes. Patient was brought to ER after this event. Patient has had no prior episodes of seizure. Of note, patient's meningioma was resected in OCT 2018 (by Dr. Mcdaniel of [facility]) and was told that 5% was remaining. Patient has had annual MRIs since and was told that her most recent in 2019 was normal.     Of note, patient has been experiencing a sore throat and cough since Monday night () associated w/ headache fatigue. +recent travel to [place] in Nov. Patient notes +f/c, n/v, headaches, but no neck stiffness, no photophobia, no sick contacts. Previous episodes of HSV "from a long time ago"- patient did not want to discuss this in front of fiance and in hallway.     In the ED, VS notable for Tmax (rectal) of 101.2 and tachycardic to 123; no leukocytosis; UA +pyruria and moderate blood; RVP negative. CTH with residual L parietal 3.8x2.3x5cm meningioma with small edema. LP was unsuccessful bedside, and patient now refusing LP and requesting transfer to [facility] vs leaving AMA.     ID consulted for evaluation of possible meningitis vs encephalitis vs progression of meningioma in setting of new seizure this morning. ROS negative for rash, vesicles, diarrhea, focal neurologic deficits; still has headache today, some n/v this morning, no photophobia.    prior hospital charts reviewed [ x ]  primary team notes reviewed [x  ]  other consultant notes reviewed [ x ]    PAST MEDICAL & SURGICAL HISTORY:  S/P resection of meningioma  HSV infection    Allergies  No Known Allergies      ANTIMICROBIALS (past 90 days)  MEDICATIONS  (STANDING):    acyclovir IVPB   264 mL/Hr IV Intermittent (20 @ 10:01)      ANTIMICROBIALS:    acyclovir IVPB 850 every 8 hours    OTHER MEDS: MEDICATIONS  (STANDING):  acetaminophen   Tablet .. 650 every 6 hours PRN  levETIRAcetam 500 two times a day      SOCIAL HISTORY:   hx smoking / non-smoker, lives with adult daughter, recent travel to [place], works as .  FAMILY HISTORY: n/a    REVIEW OF SYSTEMS  [  ] ROS unobtainable because:    [ x ] All other systems negative except as noted below:	    Constitutional:  [x ] fever [ ] chills  [ ] weight loss  [ ] weakness  Skin:  [ ] rash [ ] phlebitis	  Eyes: [ ] icterus [ ] pain  [ ] discharge	  ENMT: [x ] sore throat  [ ] thrush [ ] ulcers [ ] exudates  Respiratory: [ ] dyspnea [ ] hemoptysis [ ] cough [ ] sputum	  Cardiovascular:  [ ] chest pain [ ] palpitations [ ] edema	  Gastrointestinal:  [ ] nausea [ ] vomiting [ ] diarrhea [ ] constipation [ ] pain	  Genitourinary:  [ ] dysuria [ ] frequency [ ] hematuria [ ] discharge [ ] flank pain  [ ] incontinence  Musculoskeletal:  [ ] myalgias [ ] arthralgias [ ] arthritis  [ ] back pain  Neurological:  [ x] headache [ ] seizures  [ ] confusion/altered mental status  Psychiatric:  [ ] anxiety [ ] depression	  Hematology/Lymphatics:  [ ] lymphadenopathy  Endocrine:  [ ] adrenal [ ] thyroid  Allergic/Immunologic:	 [ ] transplant [ ] seasonal    Vital Signs Last 24 Hrs  T(F): 99.7 (20 @ 11:43), Max: 101.2 (20 @ 04:33)  Vital Signs Last 24 Hrs  HR: 99 (20 @ 11:43) (93 - 128)  BP: 123/72 (20 @ 11:43) (123/72 - 143/72)  RR: 16 (20 @ 11:43)  SpO2: 100% (20 @ 11:43) (98% - 100%)  Wt(kg): --    PHYSICAL EXAM:  Constitutional: non-toxic, no distress, awake  HEAD/EYES: atraumatic, anicteric, no conjunctival injection  ENT:  supple, no sores, no thrush  Cardiovascular:   normal S1, S2, no murmur, no edema  Respiratory:  equal breath sounds, no wheezes, no rales  GI:  soft, non-tender, normal bowel sounds  :  no gonzalez, no CVA tenderness  Musculoskeletal:  no synovitis, normal ROM  Neurologic: awake and alert,  normal strength, no focal findings  Skin:  no rash, no erythema, no phlebitis  Heme/Onc: no lymphadenopathy   Psychiatric:  awake, alert, appropriate mood      LABS:                        12.5   8.58  )-----------( 271      ( 2020 03:14 )             38.4         139  |  106  |  10  ----------------------------<  102<H>  4.0   |  24  |  0.87    Ca    9.0      2020 03:14    TPro  6.8  /  Alb  4.0  /  TBili  0.3  /  DBili  x   /  AST  22  /  ALT  13  /  AlkPhos  55      Urinalysis Basic - ( 2020 04:30 )    Color: LIGHT YELLOW / Appearance: CLEAR / S.020 / pH: 6.0  Gluc: NEGATIVE / Ketone: TRACE  / Bili: NEGATIVE / Urobili: NORMAL   Blood: MODERATE / Protein: 30 / Nitrite: NEGATIVE   Leuk Esterase: NEGATIVE / RBC: 30-50 / WBC 11-25   Sq Epi: OCC / Non Sq Epi: x / Bacteria: NEGATIVE      MICROBIOLOGY:        RADIOLOGY:  imaging below personally reviewed  < from: CT Head No Cont (20 @ 03:33) >  FINDINGS:   The patient is status post left parietal craniotomy.  Along the inner margin of the bone flap there is a 3.8 x 2.3 x 5 cm hyperattenuating extra-axial mass along the left parietal convexity, compatible with a meningioma.  There is small amount of adjacent edema in the left parietal convexity.  There is no CT evidence of acute intracranial hemorrhage, midline shift or hydrocephalus.  Gray matter-white matter differentiation is grossly preserved.     The ventricles, sulci and extra-axial spaces appear within normal limits.     The visualized paranasal sinuses are clear.    The mastoid air cells and middle ear cavities are clear.    There is no calvarial or skull base fracture.    CRITICAL VALUE: I discussed the major findings in this report with  Dr. Pena on 2020 at 3:48 AM.  Critical value policy of the hospital was followed. Read back and confirmation of receipt of this communication was performed. This verbal communication supplements the text report of this document.     IMPRESSION:   A left parietal meningioma measures 3.8 x 2.3 x 5 cm.  There is small amount of edema in the adjacent left parietal convexity.  No evidence of acute intracranial hemorrhage, midline shift, hydrocephalus or acute territorial infarct.    Follow-up MRI may be obtained for further evaluation.    < end of copied text >

## 2020-01-21 NOTE — ED ADULT NURSE REASSESSMENT NOTE - NS ED NURSE REASSESS COMMENT FT1
Report received from night RN. Pt AxOx3, ambulatory. Pt febrile. MAR called and made aware, pt given tylenol as per MD orders. Pt denies complaints at this time. Will continue to monitor.

## 2020-01-21 NOTE — ED PROVIDER NOTE - CLINICAL SUMMARY MEDICAL DECISION MAKING FREE TEXT BOX
49F PMH benign meningioma resected 2018, HSV1 and HSV2 presenting s/p witnessed generalized shaking event with tongue biting at approx 12am and post-ictal confusion for approx an hour. Vitals show tachycardic and febrile rectal temp. Well appearing AAOx3, no neuro deficits, tongue abrasions lateral aspects. Possible growth of meningioma vs. viral encephalitis? Will r/o bleed with CT head,

## 2020-01-21 NOTE — ED ADULT NURSE NOTE - OBJECTIVE STATEMENT
Break Coverage RN - Pt. received in room 26, A&Ox4, ambulatory. Pt. with hx of brain tumor in 2018 c/o witnessed seizure by family. Per daughter, pt.'s seizure lasted for "less than 1 minute." Denies hitting head, fall. 20 gauge IV inserted in right ac, positive blood return, flushes without difficulty. Respirations even & unlabored. MD at bedside for evaluation. Awaiting further orders. Break Coverage RN - Pt. received in room 26, A&Ox4, ambulatory. Pt. with hx of brain tumor in 2018 c/o witnessed seizure by family. Per daughter, pt.'s seizure lasted for "less than 1 minute." Denies hitting head, fall, dizziness, weakness, chest pain, SOB. 20 gauge IV inserted in right ac, positive blood return, flushes without difficulty. Respirations even & unlabored. MD at bedside for evaluation. Awaiting further orders.

## 2020-01-21 NOTE — CHART NOTE - NSCHARTNOTEFT_GEN_A_CORE
Internal Medicine Night Float: Event Note    Event: Called by RN for patient requesting to sign out against medical advice.  This was previously expressed by the patient to the day team.    Exam: At bedside, patient was present along with 2 family members.  Patient was clear that she wanted to leave AMA, to see her neurologist at [place].  I explained to her that the primary team believes her symptoms were likely due to mass effect from the meningioma, but have not adequately ruled out other causes, including meningitis or encephalitis, and were not comfortable with her leaving.  I also explained the risks of leaving AMA, including decompensation in transit, permanent neurologic dysfunction, or death.  Patient was cooperative and acknowledged the above.  She signed the AMA form, along with ED Nurse Manager as witness.  Signed form in chart.    Notified: Primary team intern, Hospitalist in Charge, and emailed [facility] Hospitalist.    Hank Reyes, PGY1  Internal Medicine Night Float  Pager: [phone] (NS), [phone] (PRIYANKA)

## 2020-01-21 NOTE — CHART NOTE - NSCHARTNOTEFT_GEN_A_CORE
Patient wishes to be transferred to [facility] where her neurosurgeon has priveleges, Dr. Mcdaniel.   She refuses repeat LP at this time after failed attempt in ED. Patient AAOx3 with capacity to refuse.   Will discuss with Dr. Mcdaniel regarding transfer if he accepts patient at [place].     Josh Leigh D.O.  PGY-3 EM/IM  Pager #[phone] Patient wishes to be transferred to [facility] where her neurosurgeon has priveleges, Dr. Mcdaniel.   She refuses repeat LP at this time after failed attempt in ED. Patient AAOx3 with capacity to refuse. Will discuss arranging with [facility] if patient amenable.   Will discuss with Dr. Mcdaniel regarding transfer if he accepts patient at [place].   Will hold on [facility] consultation unless acute decompensation or patient is amenable to staying.   Josh Leigh D.O.  PGY-3 EM/IM  Pager #[phone]

## 2020-01-21 NOTE — CONSULT NOTE ADULT - SUBJECTIVE AND OBJECTIVE BOX
Neurology Consult Note    Collateral history obtained from EMR. Patient's daughter (Little - [phone]) was called for more information however, [facility]     HPI:  48 y/o female with past medical history of Partial meningioma resection (2018) presents to the ED for reported seizure. Patient states she "did not feel well" on 1/20 and reported feeling URI symptoms (cough, sore throat). On 1/20 night, patient reportedly tried speaking to her daughter but was having difficulty getting out her words. Patient last remembers going to bed at 12am on 1/21 and then remembered waking up in the ambulance. Patient admits to having blood in her mouth at that time. As per EMR, patient had 2 minutes of generalized shaking accompanied with unresponsiveness and saliva coming out of her mouth while laying in bed followed by post-ictal confusion for about 1 hour. Patient denies any recent head trauma. No prior history of seizures and reports taking AED for 1 week when first diagnosed with meningioma in 10/2018. Patient reports she has been following with [facility] since her diagnosis and recently had a stable MRI brain.   At the time of my interview, patient denies any changes in vision, ringing in the ears, double vision, unilateral weakness or sensory changes, problems with speech or coordination.       MEDICATIONS  (STANDING):  acyclovir IVPB 850 milliGRAM(s) IV Intermittent every 8 hours  lactated ringers. 1000 milliLiter(s) (100 mL/Hr) IV Continuous <Continuous>    MEDICATIONS  (PRN):  acetaminophen   Tablet .. 650 milliGRAM(s) Oral every 6 hours PRN Temp greater or equal to 38C (100.4F)    PAST MEDICAL & SURGICAL HISTORY:    FAMILY HISTORY:    Allergies    No Known Allergies    Intolerances    Social History: Works as       Review of Systems:  As per HPI, otherwise negative.     Vital Signs Last 24 Hrs  T(C): 37.6 (21 Jan 2020 11:43), Max: 38.4 (21 Jan 2020 04:33)  T(F): 99.7 (21 Jan 2020 11:43), Max: 101.2 (21 Jan 2020 04:33)  HR: 99 (21 Jan 2020 11:43) (93 - 128)  BP: 123/72 (21 Jan 2020 11:43) (123/72 - 143/72)  BP(mean): --  RR: 16 (21 Jan 2020 11:43) (16 - 18)  SpO2: 100% (21 Jan 2020 11:43) (98% - 100%)    General Exam:   General appearance: No acute distress    Fundoscopic exam: Not visualized.       HEENT: +Nuchal rigidity      Neurological Exam:  Mental Status: Orientated to self, date and place. Attention intact. No dysarthria. Speech fluent.  Cranial Nerves:  PERRL, EOMI, VFF, no nystagmus. CN V1-3 intact to light touch. No facial asymmetry. Tongue, uvula and palate midline.  Sternocleidomastoid and Trapezius intact bilaterally.    Motor:   Tone: normal.                  Strength:     [] Upper extremity                      Delt       Bicep    Tricep                                                  R         5/5        5/5        5/5       5/5                                               L          5/5        5/5        5/5       5/5  [] Lower extremity                       HF          KE          KF        DF         PF                                               R        5/5 5/5        5/5       5/5       5/5                                               L         5/5        5/5       5/5       5/5        5/5  Pronator drift: none                 Dysmetria: None to finger-nose-finger or heel-shin-heel    Tremor: No resting, postural or action tremor.  No myoclonus.    Sensation: intact to light touch bilaterally throughout.     Deep Tendon Reflexes:     Biceps          Triceps      BR        Patellar        Ankle         Babinski                                         R       1+                   1+           1+            1+               1+           downgoing                                         L       1+                  1+           1+            1+               1+           downgoing    Gait: Not assessed.     Other:    01-21    139  |  106  |  10  ----------------------------<  102<H>  4.0   |  24  |  0.87    Ca    9.0      21 Jan 2020 03:14    TPro  6.8  /  Alb  4.0  /  TBili  0.3  /  DBili  x   /  AST  22  /  ALT  13  /  AlkPhos  55  01-21                            12.5   8.58  )-----------( 271      ( 21 Jan 2020 03:14 )             38.4       Radiology    CT: < from: CT Head No Cont (01.21.20 @ 03:33) >  IMPRESSION:   A left parietal meningioma measures 3.8 x 2.3 x 5 cm.  There is small amount of edema in the adjacent left parietal convexity.  No evidence of acute intracranial hemorrhage, midline shift, hydrocephalus or acute territorial infarct.    Follow-up MRI may be obtained for further evaluation.    < end of copied text >    MRI  EKG:  tele:  TTE:  EEG:

## 2020-01-21 NOTE — H&P ADULT - NSHPREVIEWOFSYSTEMS_GEN_ALL_CORE
General: denies fever, chills  HENT: +sore throat, denies nasal congestion, rhinorrhea  Eyes: denies visual changes, blurred vision  Neck: denies neck pain, neck swelling  CV: denies chest pain, palpitations  Resp: denies difficulty breathing, cough  Abdominal: denies nausea, vomiting, diarrhea, abdominal pain  MSK: denies muscle aches  Neuro: +headaches denies numbness, tingling, dizziness, lightheadedness  Skin: denies rashes, cuts, bruises  Hematologic: denies unexplained bruises

## 2020-01-21 NOTE — CONSULT NOTE ADULT - NSHPATTENDINGPLANDISCUSS_GEN_ALL_CORE
neurology resident over the phone yesterday afternoon however patient signed out AMA prior to me seeing her. She was instructed to be compliant with her antiseizure meds and NOT to drive for 6 months as she needs to be seizure free for 6 months before operating a vehicle per [place] state law.
team

## 2020-01-22 LAB
BACTERIA UR CULT: SIGNIFICANT CHANGE UP
SPECIMEN SOURCE: SIGNIFICANT CHANGE UP

## 2020-01-23 RX ORDER — VALACYCLOVIR 500 MG/1
1 TABLET, FILM COATED ORAL
Qty: 0 | Refills: 0 | DISCHARGE

## 2020-01-26 LAB
BACTERIA BLD CULT: SIGNIFICANT CHANGE UP
BACTERIA BLD CULT: SIGNIFICANT CHANGE UP

## 2020-08-16 DIAGNOSIS — Z12.9 ENCOUNTER FOR SCREENING FOR MALIGNANT NEOPLASM, SITE UNSPECIFIED: ICD-10-CM

## 2020-09-09 PROBLEM — B00.9 HERPESVIRAL INFECTION, UNSPECIFIED: Chronic | Status: ACTIVE | Noted: 2020-01-21

## 2020-09-09 PROBLEM — Z00.00 ENCOUNTER FOR PREVENTIVE HEALTH EXAMINATION: Status: ACTIVE | Noted: 2020-09-09

## 2020-09-09 PROBLEM — Z98.890 OTHER SPECIFIED POSTPROCEDURAL STATES: Chronic | Status: ACTIVE | Noted: 2020-01-21

## 2020-09-11 ENCOUNTER — APPOINTMENT (OUTPATIENT)
Dept: OTHER | Facility: CLINIC | Age: 50
End: 2020-09-11
Payer: COMMERCIAL

## 2020-09-11 DIAGNOSIS — Z86.018 PERSONAL HISTORY OF OTHER BENIGN NEOPLASM: ICD-10-CM

## 2020-09-11 DIAGNOSIS — Z13.9 ENCOUNTER FOR SCREENING, UNSPECIFIED: ICD-10-CM

## 2020-09-11 DIAGNOSIS — Z78.9 OTHER SPECIFIED HEALTH STATUS: ICD-10-CM

## 2020-09-11 DIAGNOSIS — K64.4 RESIDUAL HEMORRHOIDAL SKIN TAGS: ICD-10-CM

## 2020-09-11 PROCEDURE — 99386 PREV VISIT NEW AGE 40-64: CPT | Mod: 95

## 2020-09-11 RX ORDER — LEVETIRACETAM 500 MG/1
500 TABLET, FILM COATED ORAL
Refills: 0 | Status: ACTIVE | COMMUNITY
Start: 2020-09-11

## 2020-09-11 NOTE — DISCUSSION/SUMMARY
[Home] : at home, [unfilled] , at the time of the visit. [Other Location: e.g. Home (Enter Location, City,State)___] : at [unfilled] [Patient seen for WTC Monitoring ___] : Patient was seen for WTC monitoring [unfilled] [Please See Note in Chart and Documentation in Trial DB] : Please see note in chart and documentation in Trial DB. [FreeTextEntry3] : Ms. 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

## 2020-10-08 PROBLEM — Z12.9 SCREENING OF CANCER: Status: ACTIVE | Noted: 2020-10-08

## 2020-11-02 ENCOUNTER — APPOINTMENT (OUTPATIENT)
Dept: ULTRASOUND IMAGING | Facility: HOSPITAL | Age: 50
End: 2020-11-02
Payer: COMMERCIAL

## 2020-11-02 ENCOUNTER — OUTPATIENT (OUTPATIENT)
Dept: OUTPATIENT SERVICES | Facility: HOSPITAL | Age: 50
LOS: 1 days | End: 2020-11-02
Payer: COMMERCIAL

## 2020-11-02 DIAGNOSIS — Z12.9 ENCOUNTER FOR SCREENING FOR MALIGNANT NEOPLASM, SITE UNSPECIFIED: ICD-10-CM

## 2020-11-02 PROCEDURE — 76830 TRANSVAGINAL US NON-OB: CPT | Mod: 26

## 2020-11-02 PROCEDURE — 76856 US EXAM PELVIC COMPLETE: CPT | Mod: 26

## 2020-11-02 PROCEDURE — 76830 TRANSVAGINAL US NON-OB: CPT

## 2020-11-02 PROCEDURE — 76856 US EXAM PELVIC COMPLETE: CPT

## 2020-11-05 ENCOUNTER — NON-APPOINTMENT (OUTPATIENT)
Age: 50
End: 2020-11-05

## 2021-02-18 ENCOUNTER — NON-APPOINTMENT (OUTPATIENT)
Age: 51
End: 2021-02-18

## 2021-02-26 ENCOUNTER — NON-APPOINTMENT (OUTPATIENT)
Age: 51
End: 2021-02-26

## 2021-03-02 ENCOUNTER — NON-APPOINTMENT (OUTPATIENT)
Age: 51
End: 2021-03-02

## 2021-03-24 ENCOUNTER — NON-APPOINTMENT (OUTPATIENT)
Age: 51
End: 2021-03-24

## 2021-04-13 ENCOUNTER — NON-APPOINTMENT (OUTPATIENT)
Age: 51
End: 2021-04-13

## 2021-05-16 ENCOUNTER — FORM ENCOUNTER (OUTPATIENT)
Age: 51
End: 2021-05-16

## 2021-08-26 ENCOUNTER — APPOINTMENT (OUTPATIENT)
Dept: OTHER | Facility: CLINIC | Age: 51
End: 2021-08-26
Payer: COMMERCIAL

## 2021-08-26 VITALS
OXYGEN SATURATION: 98 % | WEIGHT: 194 LBS | HEIGHT: 66 IN | TEMPERATURE: 98.6 F | HEART RATE: 69 BPM | DIASTOLIC BLOOD PRESSURE: 87 MMHG | RESPIRATION RATE: 18 BRPM | BODY MASS INDEX: 31.18 KG/M2 | SYSTOLIC BLOOD PRESSURE: 135 MMHG

## 2021-08-26 PROCEDURE — 99396 PREV VISIT EST AGE 40-64: CPT | Mod: 25

## 2021-08-26 NOTE — DISCUSSION/SUMMARY
[Patient seen for WTC Monitoring ___] : Patient was seen for WTC monitoring [unfilled] [Please See Note in Chart and Documentation in Trial DB] : Please see note in chart and documentation in Trial DB. [FreeTextEntry3] : Ms. FORTUNATO JENSEN is a 50  year old female \par \par had breast BX 02 2021- intraductal papilloma with apocrine metaplasia \par  stated that had neg results \par Dr. 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\par Sep- -  May- 2-3 times per week, 12 hour shifts \par \par \par ROS\par documented in Trial DB and REviewed with the pt\par PMH: meningioma, seizures, uterine fibroids, gall stones on inner imaging body scan 2020, solitary kidney- found on body scan 2020  \par PSH:hemorrhoid resection , craniotomy for meningioma resection , lumpectomy for benign breast lump\par Medications reviewed \par Social History \par  non smoker \par ETOH denies \par \par Fam Hx: reviewed in trial DB \par \par \par \par \par A/P: WTC initial  Monitoring visit \par patient will follow up with her GYN re uterine bibroid and with breast surgeon re mammogram frequency recommendations\par \par  GI referral made for screening colonoscopy  \par

## 2021-08-27 LAB
ALBUMIN SERPL ELPH-MCNC: 4.1 G/DL
ALP BLD-CCNC: 59 U/L
ALT SERPL-CCNC: 17 U/L
ANION GAP SERPL CALC-SCNC: 10 MMOL/L
APPEARANCE: CLEAR
AST SERPL-CCNC: 18 U/L
BACTERIA: NEGATIVE
BASOPHILS # BLD AUTO: 0.1 K/UL
BASOPHILS NFR BLD AUTO: 1.6 %
BILIRUB SERPL-MCNC: 0.2 MG/DL
BILIRUBIN URINE: NEGATIVE
BLOOD URINE: ABNORMAL
BUN SERPL-MCNC: 11 MG/DL
CALCIUM SERPL-MCNC: 9.4 MG/DL
CHLORIDE SERPL-SCNC: 105 MMOL/L
CHOLEST SERPL-MCNC: 210 MG/DL
CO2 SERPL-SCNC: 22 MMOL/L
COLOR: YELLOW
CREAT SERPL-MCNC: 0.87 MG/DL
EOSINOPHIL # BLD AUTO: 0.21 K/UL
EOSINOPHIL NFR BLD AUTO: 3.4 %
GLUCOSE QUALITATIVE U: NEGATIVE
GLUCOSE SERPL-MCNC: 80 MG/DL
HCT VFR BLD CALC: 42.5 %
HDLC SERPL-MCNC: 51 MG/DL
HGB BLD-MCNC: 13 G/DL
HYALINE CASTS: 1 /LPF
IMM GRANULOCYTES NFR BLD AUTO: 0.3 %
KETONES URINE: NEGATIVE
LDLC SERPL CALC-MCNC: 130 MG/DL
LEUKOCYTE ESTERASE URINE: NEGATIVE
LYMPHOCYTES # BLD AUTO: 2.15 K/UL
LYMPHOCYTES NFR BLD AUTO: 35.3 %
MAN DIFF?: NORMAL
MCHC RBC-ENTMCNC: 30.6 GM/DL
MCHC RBC-ENTMCNC: 30.7 PG
MCV RBC AUTO: 100.5 FL
MICROSCOPIC-UA: NORMAL
MONOCYTES # BLD AUTO: 0.36 K/UL
MONOCYTES NFR BLD AUTO: 5.9 %
NEUTROPHILS # BLD AUTO: 3.25 K/UL
NEUTROPHILS NFR BLD AUTO: 53.5 %
NITRITE URINE: NEGATIVE
NONHDLC SERPL-MCNC: 159 MG/DL
PH URINE: 6
PLATELET # BLD AUTO: 159 K/UL
POTASSIUM SERPL-SCNC: 4.4 MMOL/L
PROT SERPL-MCNC: 6.7 G/DL
PROTEIN URINE: NEGATIVE
RBC # BLD: 4.23 M/UL
RBC # FLD: 15 %
RED BLOOD CELLS URINE: 1 /HPF
SODIUM SERPL-SCNC: 137 MMOL/L
SPECIFIC GRAVITY URINE: 1.02
SQUAMOUS EPITHELIAL CELLS: 1 /HPF
TRIGL SERPL-MCNC: 146 MG/DL
UROBILINOGEN URINE: NORMAL
WBC # FLD AUTO: 6.09 K/UL
WHITE BLOOD CELLS URINE: 1 /HPF

## 2021-09-16 LAB
APPEARANCE: CLEAR
BACTERIA: NEGATIVE
BILIRUBIN URINE: NEGATIVE
BLOOD URINE: NEGATIVE
COLOR: COLORLESS
GLUCOSE QUALITATIVE U: NEGATIVE
HYALINE CASTS: 3 /LPF
KETONES URINE: NEGATIVE
LEUKOCYTE ESTERASE URINE: NEGATIVE
MICROSCOPIC-UA: NORMAL
NITRITE URINE: NEGATIVE
PH URINE: 6
PROTEIN URINE: NEGATIVE
RED BLOOD CELLS URINE: 2 /HPF
SPECIFIC GRAVITY URINE: 1.01
SQUAMOUS EPITHELIAL CELLS: 7 /HPF
UROBILINOGEN URINE: NORMAL
WHITE BLOOD CELLS URINE: 1 /HPF

## 2021-12-07 ENCOUNTER — NON-APPOINTMENT (OUTPATIENT)
Age: 51
End: 2021-12-07

## 2021-12-07 ENCOUNTER — APPOINTMENT (OUTPATIENT)
Dept: GASTROENTEROLOGY | Facility: CLINIC | Age: 51
End: 2021-12-07
Payer: COMMERCIAL

## 2021-12-07 VITALS
DIASTOLIC BLOOD PRESSURE: 85 MMHG | HEART RATE: 72 BPM | WEIGHT: 189 LBS | BODY MASS INDEX: 30.37 KG/M2 | HEIGHT: 66 IN | TEMPERATURE: 97.9 F | SYSTOLIC BLOOD PRESSURE: 139 MMHG

## 2021-12-07 DIAGNOSIS — Z12.11 ENCOUNTER FOR SCREENING FOR MALIGNANT NEOPLASM OF COLON: ICD-10-CM

## 2021-12-07 PROCEDURE — 99203 OFFICE O/P NEW LOW 30 MIN: CPT

## 2021-12-07 NOTE — ASSESSMENT
[FreeTextEntry1] : Reflux without warning signs to suggest esophagitis\par Colon cancer screening, average risk\par \par Plan\par Indications risks benefits and alternatives to colonoscopy reviewed with patient agreeable to examination\par Agreeable to therapeutic trial omeprazole 20 mg daily for frequent reflux complaints\par \par \par

## 2021-12-07 NOTE — HISTORY OF PRESENT ILLNESS
[de-identified] : 51-year-old female presents for evaluation prior to screening colonoscopy\par No history of prior examination\par Denies rectal bleeding or change of bowel habit\par Denies family history of colon cancer\par \par Patient does complain of acid reflux, using Tums as needed, particularly prominent complaints at night\par Denies dysphagia nausea or vomiting, denies weight loss\par Denies regular use of aspirin or nonsteroidals\par \par Denies any history of coronary disease congestive heart failure or dysrhythmia\par \par Patient with history of benign brain mass resection, radiation, single seizure, stable on Keppra\par \par Social history: Retired [place] [employer]

## 2022-01-24 ENCOUNTER — NON-APPOINTMENT (OUTPATIENT)
Age: 52
End: 2022-01-24

## 2022-02-07 ENCOUNTER — NON-APPOINTMENT (OUTPATIENT)
Age: 52
End: 2022-02-07

## 2022-02-09 ENCOUNTER — APPOINTMENT (OUTPATIENT)
Dept: GASTROENTEROLOGY | Facility: AMBULATORY MEDICAL SERVICES | Age: 52
End: 2022-02-09
Payer: COMMERCIAL

## 2022-02-09 DIAGNOSIS — R12 HEARTBURN: ICD-10-CM

## 2022-02-09 PROCEDURE — 45378 DIAGNOSTIC COLONOSCOPY: CPT | Mod: 33

## 2022-02-11 PROBLEM — R12 HEARTBURN: Status: ACTIVE | Noted: 2021-12-07

## 2022-08-05 ENCOUNTER — APPOINTMENT (OUTPATIENT)
Dept: OTHER | Facility: CLINIC | Age: 52
End: 2022-08-05

## 2022-08-05 VITALS
OXYGEN SATURATION: 100 % | HEART RATE: 73 BPM | WEIGHT: 195 LBS | BODY MASS INDEX: 31.34 KG/M2 | TEMPERATURE: 98.6 F | DIASTOLIC BLOOD PRESSURE: 88 MMHG | SYSTOLIC BLOOD PRESSURE: 144 MMHG | HEIGHT: 66 IN

## 2022-08-05 PROCEDURE — 99396 PREV VISIT EST AGE 40-64: CPT | Mod: 25

## 2022-08-05 RX ORDER — LEVETIRACETAM 500 MG/1
500 TABLET, FILM COATED, EXTENDED RELEASE ORAL
Qty: 180 | Refills: 0 | Status: COMPLETED | COMMUNITY
Start: 2022-05-02

## 2022-08-05 RX ORDER — POLYETHYLENE GLYCOL 3350 AND ELECTROLYTES WITH LEMON FLAVOR 236; 22.74; 6.74; 5.86; 2.97 G/4L; G/4L; G/4L; G/4L; G/4L
236 POWDER, FOR SOLUTION ORAL
Qty: 1 | Refills: 0 | Status: DISCONTINUED | COMMUNITY
Start: 2022-02-07 | End: 2022-08-05

## 2022-08-05 RX ORDER — OMEPRAZOLE 20 MG/1
20 CAPSULE, DELAYED RELEASE ORAL
Qty: 1 | Refills: 5 | Status: ACTIVE | COMMUNITY
Start: 2021-12-07 | End: 1900-01-01

## 2022-08-05 RX ORDER — VALACYCLOVIR 1 G/1
1 TABLET, FILM COATED ORAL
Qty: 10 | Refills: 0 | Status: COMPLETED | COMMUNITY
Start: 2022-07-27

## 2022-08-05 RX ORDER — SODIUM SULFATE, POTASSIUM SULFATE, MAGNESIUM SULFATE 17.5; 3.13; 1.6 G/ML; G/ML; G/ML
17.5-3.13-1.6 SOLUTION, CONCENTRATE ORAL
Qty: 1 | Refills: 0 | Status: DISCONTINUED | COMMUNITY
Start: 2021-12-07 | End: 2022-08-05

## 2022-08-05 NOTE — HEALTH RISK ASSESSMENT
[Patient reported colonoscopy was normal] : Patient reported colonoscopy was normal [ColonoscopyDate] : 02/2022 [ColonoscopyComments] : melanosis coli

## 2022-08-05 NOTE — DISCUSSION/SUMMARY
[Patient seen for WTC Monitoring ___] : Patient was seen for WTC monitoring [unfilled] [Please See Note in Chart and Documentation in Trial DB] : Please see note in chart and documentation in Trial DB. [FreeTextEntry3] : Ms. FORTUNATO JENSEN is a 51  year old female \par  gets annual mammogram at [facility] \par \par had breast BX 02 2021- intraductal papilloma with apocrine metaplasia \par  stated that had neg results \par \par Dr. 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\par Sep- -  May- 2-3 times per week, 12 hour shifts \par \par \par ROS\par documented in Trial DB and REviewed with the pt\par PMH: meningioma, seizures, uterine fibroids, gall stones on inner imaging body scan 2020, solitary kidney- found on body scan 2020  \par PSH:hemorrhoid resection , craniotomy for meningioma resection , lumpectomy for benign breast lump\par Medications reviewed \par Social History \par  non smoker \par ETOH denies \par \par Fam Hx: reviewed in trial DB \par \par \par PE: IN trial DB \par \par A/P: WTC initial  Monitoring visit \par \par dysphagia is likely due to  GERD -start omeprazole \par \par \par  GI referral made for EGD to eval dysphagia \par

## 2022-08-08 LAB
ALBUMIN SERPL ELPH-MCNC: 4 G/DL
ALP BLD-CCNC: 53 U/L
ALT SERPL-CCNC: 13 U/L
ANION GAP SERPL CALC-SCNC: 12 MMOL/L
APPEARANCE: CLEAR
AST SERPL-CCNC: 16 U/L
BACTERIA: NEGATIVE
BASOPHILS # BLD AUTO: 0.06 K/UL
BASOPHILS NFR BLD AUTO: 0.9 %
BILIRUB SERPL-MCNC: 0.2 MG/DL
BILIRUBIN URINE: NEGATIVE
BLOOD URINE: NORMAL
BUN SERPL-MCNC: 12 MG/DL
CALCIUM SERPL-MCNC: 9.1 MG/DL
CHLORIDE SERPL-SCNC: 106 MMOL/L
CHOLEST SERPL-MCNC: 217 MG/DL
CO2 SERPL-SCNC: 22 MMOL/L
COLOR: NORMAL
CREAT SERPL-MCNC: 0.87 MG/DL
EGFR: 82 ML/MIN/1.73M2
EOSINOPHIL # BLD AUTO: 0.21 K/UL
EOSINOPHIL NFR BLD AUTO: 3.2 %
GLUCOSE QUALITATIVE U: NEGATIVE
GLUCOSE SERPL-MCNC: 79 MG/DL
HCT VFR BLD CALC: 39.6 %
HDLC SERPL-MCNC: 50 MG/DL
HGB BLD-MCNC: 12 G/DL
HYALINE CASTS: 1 /LPF
IMM GRANULOCYTES NFR BLD AUTO: 0.3 %
KETONES URINE: NEGATIVE
LDLC SERPL CALC-MCNC: 149 MG/DL
LEUKOCYTE ESTERASE URINE: NEGATIVE
LYMPHOCYTES # BLD AUTO: 2.38 K/UL
LYMPHOCYTES NFR BLD AUTO: 36.2 %
MAN DIFF?: NORMAL
MCHC RBC-ENTMCNC: 28.8 PG
MCHC RBC-ENTMCNC: 30.3 GM/DL
MCV RBC AUTO: 95 FL
MICROSCOPIC-UA: NORMAL
MONOCYTES # BLD AUTO: 0.43 K/UL
MONOCYTES NFR BLD AUTO: 6.5 %
NEUTROPHILS # BLD AUTO: 3.48 K/UL
NEUTROPHILS NFR BLD AUTO: 52.9 %
NITRITE URINE: NEGATIVE
NONHDLC SERPL-MCNC: 167 MG/DL
PH URINE: 6
PLATELET # BLD AUTO: 286 K/UL
POTASSIUM SERPL-SCNC: 4 MMOL/L
PROT SERPL-MCNC: 6.8 G/DL
PROTEIN URINE: NEGATIVE
RBC # BLD: 4.17 M/UL
RBC # FLD: 14.3 %
RED BLOOD CELLS URINE: 0 /HPF
SODIUM SERPL-SCNC: 139 MMOL/L
SPECIFIC GRAVITY URINE: 1.01
SQUAMOUS EPITHELIAL CELLS: 3 /HPF
TRIGL SERPL-MCNC: 92 MG/DL
UROBILINOGEN URINE: NORMAL
WBC # FLD AUTO: 6.58 K/UL
WHITE BLOOD CELLS URINE: 1 /HPF

## 2022-12-23 NOTE — H&P ADULT - PROBLEM SELECTOR PROBLEM 1
For information on Fall & Injury Prevention, visit: [email or web address] OR  [email or web address] OR  https://www.cdc.gov/steadi/patient.html
Seizure

## 2022-12-29 ENCOUNTER — APPOINTMENT (OUTPATIENT)
Dept: GASTROENTEROLOGY | Facility: CLINIC | Age: 52
End: 2022-12-29

## 2023-01-16 ENCOUNTER — FORM ENCOUNTER (OUTPATIENT)
Age: 53
End: 2023-01-16

## 2023-07-27 ENCOUNTER — APPOINTMENT (OUTPATIENT)
Dept: OTHER | Facility: CLINIC | Age: 53
End: 2023-07-27
Payer: COMMERCIAL

## 2023-07-27 VITALS
DIASTOLIC BLOOD PRESSURE: 76 MMHG | WEIGHT: 195 LBS | OXYGEN SATURATION: 99 % | BODY MASS INDEX: 31.34 KG/M2 | TEMPERATURE: 98.3 F | HEART RATE: 78 BPM | HEIGHT: 66 IN | RESPIRATION RATE: 18 BRPM | SYSTOLIC BLOOD PRESSURE: 134 MMHG

## 2023-07-27 DIAGNOSIS — Z04.9 ENCOUNTER FOR EXAMINATION AND OBSERVATION FOR UNSPECIFIED REASON: ICD-10-CM

## 2023-07-27 DIAGNOSIS — Z12.9 ENCOUNTER FOR SCREENING FOR MALIGNANT NEOPLASM, SITE UNSPECIFIED: ICD-10-CM

## 2023-07-27 PROCEDURE — 99396 PREV VISIT EST AGE 40-64: CPT | Mod: 25

## 2023-07-27 PROCEDURE — 94010 BREATHING CAPACITY TEST: CPT

## 2023-07-27 NOTE — DISCUSSION/SUMMARY
[Patient seen for WTC Monitoring ___] : Patient was seen for WTC monitoring [unfilled] [Please See Note in Chart and Documentation in Trial DB] : Please see note in chart and documentation in Trial DB. [FreeTextEntry3] : Ms. FORTUNATO JENSEN is a 52   year old female \par  gets annual mammogram at [facility] \par \par had breast BX 02 2021- intraductal papilloma with apocrine metaplasia \par  stated that had neg results \par \par Dr. 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\par Sep- -  May- 2-3 times per week, 12 hour shifts \par \par \par ROS\par documented in Trial DB and REviewed with the pt\par PMH: meningioma, seizures, uterine fibroids, gall stones on inner imaging body scan 2020, solitary kidney- found on body scan 2020  \par PSH:hemorrhoid resection , craniotomy for meningioma resection , lumpectomy for benign breast lump\par Medications reviewed \par Social History \par  non smoker \par ETOH denies \par \par Fam Hx: reviewed in trial DB \par \par \par PE: IN trial DB \par spirometry NL \par  CXR 08 2022 \par A/P: C initial  Monitoring visit \par \par dysphagia is likely due to  GERD -start omeprazole \par Left breast lump - patient made appointment with her breast surgeon 08 28 2023 who ordered JERSEY breast sonogram / will be covered under [facility] HTP /\par

## 2023-07-27 NOTE — HEALTH RISK ASSESSMENT
[Patient reported mammogram was normal] : Patient reported mammogram was normal [Patient reported colonoscopy was normal] : Patient reported colonoscopy was normal [MammogramDate] : 02/01/2023 [ColonoscopyDate] : 02/2022 [ColonoscopyComments] : melanosis coli

## 2023-07-28 LAB
ALBUMIN SERPL ELPH-MCNC: 4.1 G/DL
ALP BLD-CCNC: 57 U/L
ALT SERPL-CCNC: 13 U/L
ANION GAP SERPL CALC-SCNC: 11 MMOL/L
APPEARANCE: CLEAR
AST SERPL-CCNC: 16 U/L
BACTERIA: ABNORMAL /HPF
BILIRUB SERPL-MCNC: 0.2 MG/DL
BILIRUBIN URINE: NEGATIVE
BLOOD URINE: ABNORMAL
BUN SERPL-MCNC: 8 MG/DL
CALCIUM SERPL-MCNC: 9.4 MG/DL
CAST: 0 /LPF
CHLORIDE SERPL-SCNC: 103 MMOL/L
CHOLEST SERPL-MCNC: 215 MG/DL
CO2 SERPL-SCNC: 23 MMOL/L
COLOR: YELLOW
CREAT SERPL-MCNC: 0.86 MG/DL
EGFR: 82 ML/MIN/1.73M2
EPITHELIAL CELLS: 1 /HPF
GLUCOSE QUALITATIVE U: NEGATIVE MG/DL
GLUCOSE SERPL-MCNC: 75 MG/DL
HDLC SERPL-MCNC: 47 MG/DL
KETONES URINE: NEGATIVE MG/DL
LDLC SERPL CALC-MCNC: 144 MG/DL
LEUKOCYTE ESTERASE URINE: ABNORMAL
MICROSCOPIC-UA: NORMAL
NITRITE URINE: NEGATIVE
NONHDLC SERPL-MCNC: 167 MG/DL
PH URINE: 6
POTASSIUM SERPL-SCNC: 4.7 MMOL/L
PROT SERPL-MCNC: 7 G/DL
PROTEIN URINE: NEGATIVE MG/DL
RED BLOOD CELLS URINE: 2 /HPF
SODIUM SERPL-SCNC: 138 MMOL/L
SPECIFIC GRAVITY URINE: 1.01
TRIGL SERPL-MCNC: 133 MG/DL
UROBILINOGEN URINE: 0.2 MG/DL
WHITE BLOOD CELLS URINE: 9 /HPF

## 2023-08-14 ENCOUNTER — NON-APPOINTMENT (OUTPATIENT)
Age: 53
End: 2023-08-14

## 2023-08-25 LAB
APPEARANCE: CLEAR
BACTERIA: NEGATIVE /HPF
BILIRUBIN URINE: NEGATIVE
BLOOD URINE: ABNORMAL
CAST: 1 /LPF
COLOR: YELLOW
EPITHELIAL CELLS: 2 /HPF
GLUCOSE QUALITATIVE U: NEGATIVE MG/DL
KETONES URINE: NEGATIVE MG/DL
LEUKOCYTE ESTERASE URINE: ABNORMAL
MICROSCOPIC-UA: NORMAL
NITRITE URINE: NEGATIVE
PH URINE: 5.5
PROTEIN URINE: NEGATIVE MG/DL
RED BLOOD CELLS URINE: 3 /HPF
REVIEW: NORMAL
SPECIFIC GRAVITY URINE: 1.01
UROBILINOGEN URINE: 0.2 MG/DL
WHITE BLOOD CELLS URINE: 14 /HPF

## 2024-01-04 ENCOUNTER — NON-APPOINTMENT (OUTPATIENT)
Age: 54
End: 2024-01-04

## 2024-02-06 ENCOUNTER — NON-APPOINTMENT (OUTPATIENT)
Age: 54
End: 2024-02-06

## 2024-02-06 ENCOUNTER — APPOINTMENT (OUTPATIENT)
Dept: CARDIOLOGY | Facility: CLINIC | Age: 54
End: 2024-02-06
Payer: COMMERCIAL

## 2024-02-06 VITALS — SYSTOLIC BLOOD PRESSURE: 132 MMHG | DIASTOLIC BLOOD PRESSURE: 90 MMHG

## 2024-02-06 VITALS
HEART RATE: 70 BPM | OXYGEN SATURATION: 100 % | DIASTOLIC BLOOD PRESSURE: 90 MMHG | BODY MASS INDEX: 31.47 KG/M2 | WEIGHT: 195 LBS | SYSTOLIC BLOOD PRESSURE: 128 MMHG

## 2024-02-06 DIAGNOSIS — E78.5 HYPERLIPIDEMIA, UNSPECIFIED: ICD-10-CM

## 2024-02-06 PROCEDURE — 99203 OFFICE O/P NEW LOW 30 MIN: CPT | Mod: 25

## 2024-02-06 PROCEDURE — 93000 ELECTROCARDIOGRAM COMPLETE: CPT

## 2024-02-06 NOTE — PHYSICAL EXAM
[Well Developed] : well developed [Well Nourished] : well nourished [Normal Conjunctiva] : normal conjunctiva [Normal Venous Pressure] : normal venous pressure [No Carotid Bruit] : no carotid bruit [Normal S1, S2] : normal S1, S2 [No Murmur] : no murmur [Clear Lung Fields] : clear lung fields [Good Air Entry] : good air entry [Soft] : abdomen soft [Normal Gait] : normal gait [No Edema] : no edema [No Rash] : no rash [Moves all extremities] : moves all extremities [Alert and Oriented] : alert and oriented [Normal memory] : normal memory

## 2024-02-06 NOTE — DISCUSSION/SUMMARY
[EKG obtained to assist in diagnosis and management of assessed problem(s)] : EKG obtained to assist in diagnosis and management of assessed problem(s) [FreeTextEntry1] : She is a 53-year-old former  with reportedly abnormal ECG done in our office and mildly elevated lipids with a coronary calcium score of 0. We discussed the need for more aggressive diet and exercise and dying suggested that she repeat her blood work in 6 months and if her LDL remains over 130 mg/dL medical therapy is appropriate. Able to provide the previous ECG which turns out to be normal with some artifact that was likely read as abnormal.

## 2024-02-06 NOTE — HISTORY OF PRESENT ILLNESS
[FreeTextEntry1] : Thank you for referring her for cardiovascular valuation.  She was seen by her internist and noted to have an abnormal ECG and was referred to me for cardiovascular evaluation. She is a retired  who has been feeling well and is able to go about her usual activities without any difficulty.  She reports running for 20 minutes and then walking for up to 2 miles without any chest pains or shortness of breath. Recent blood work showed a LDL of 140 mg/dL. This has been similar to previous lab work going back to 2021. She has no history of coronary artery disease, diabetes mellitus, hypercholesterolemia, smoking or family history of premature coronary artery disease, though her mother did suffer from strokes in her 40s and had ongoing diabetes and hypertension. Prior to leaving the police force in 2020 [place] she underwent a body scan that revealed a solitary kidney and a coronary artery calcium score of 0. She had 1 episode of seizure in January 2020 in the setting of a illness.  She also had meningioma removal in 2018.

## 2024-04-19 ENCOUNTER — APPOINTMENT (OUTPATIENT)
Dept: OBGYN | Facility: CLINIC | Age: 54
End: 2024-04-19
Payer: COMMERCIAL

## 2024-04-19 VITALS
HEART RATE: 74 BPM | SYSTOLIC BLOOD PRESSURE: 128 MMHG | WEIGHT: 190 LBS | BODY MASS INDEX: 30.53 KG/M2 | RESPIRATION RATE: 14 BRPM | DIASTOLIC BLOOD PRESSURE: 70 MMHG | HEIGHT: 66 IN

## 2024-04-19 PROCEDURE — 99204 OFFICE O/P NEW MOD 45 MIN: CPT | Mod: 57

## 2024-06-10 ENCOUNTER — APPOINTMENT (OUTPATIENT)
Dept: OBGYN | Facility: CLINIC | Age: 54
End: 2024-06-10
Payer: COMMERCIAL

## 2024-06-10 VITALS
WEIGHT: 197 LBS | RESPIRATION RATE: 16 BRPM | HEART RATE: 80 BPM | HEIGHT: 66 IN | SYSTOLIC BLOOD PRESSURE: 130 MMHG | DIASTOLIC BLOOD PRESSURE: 84 MMHG | BODY MASS INDEX: 31.66 KG/M2

## 2024-06-10 DIAGNOSIS — D25.2 INTRAMURAL LEIOMYOMA OF UTERUS: ICD-10-CM

## 2024-06-10 DIAGNOSIS — D25.1 INTRAMURAL LEIOMYOMA OF UTERUS: ICD-10-CM

## 2024-06-10 DIAGNOSIS — R10.2 PELVIC AND PERINEAL PAIN: ICD-10-CM

## 2024-06-10 PROCEDURE — 99213 OFFICE O/P EST LOW 20 MIN: CPT

## 2024-06-10 RX ORDER — OXYCODONE AND ACETAMINOPHEN 5; 325 MG/1; MG/1
5-325 TABLET ORAL EVERY 4 HOURS
Qty: 30 | Refills: 0 | Status: ACTIVE | COMMUNITY
Start: 2024-06-10 | End: 1900-01-01

## 2024-06-10 NOTE — HISTORY OF PRESENT ILLNESS
[FreeTextEntry1] : PT PRESENTS FOR PREOP FOR SURGERY. DISCUSSED SURGICAL CARE PLAN. WILL PROCEED WITH SUBTOTAL HYSTERECTOMY

## 2024-06-10 NOTE — PHYSICAL EXAM
[Chaperone Declined] : Patient declined chaperone [Appropriately responsive] : appropriately responsive [Alert] : alert [No Acute Distress] : no acute distress [Soft] : soft [Non-tender] : non-tender [Non-distended] : non-distended [No HSM] : No HSM [No Lesions] : no lesions [No Mass] : no mass [Oriented x3] : oriented x3 [FreeTextEntry7] : INCISION SITES SHOWN, UTERUS PALPABLE. DISCUSSED PALMERS [place]INT APPROACH [Enlarged ___ wks] : enlarged [unfilled] ~Uweeks

## 2024-06-11 ENCOUNTER — TRANSCRIPTION ENCOUNTER (OUTPATIENT)
Age: 54
End: 2024-06-11

## 2024-06-11 ENCOUNTER — OUTPATIENT (OUTPATIENT)
Dept: OUTPATIENT SERVICES | Facility: HOSPITAL | Age: 54
LOS: 1 days | End: 2024-06-11
Payer: COMMERCIAL

## 2024-06-11 VITALS
SYSTOLIC BLOOD PRESSURE: 131 MMHG | HEART RATE: 73 BPM | DIASTOLIC BLOOD PRESSURE: 91 MMHG | RESPIRATION RATE: 18 BRPM | OXYGEN SATURATION: 99 % | WEIGHT: 197.09 LBS | HEIGHT: 66 IN | TEMPERATURE: 97 F

## 2024-06-11 DIAGNOSIS — D25.1 INTRAMURAL LEIOMYOMA OF UTERUS: ICD-10-CM

## 2024-06-11 DIAGNOSIS — Z87.59 PERSONAL HISTORY OF OTHER COMPLICATIONS OF PREGNANCY, CHILDBIRTH AND THE PUERPERIUM: Chronic | ICD-10-CM

## 2024-06-11 DIAGNOSIS — Z01.818 ENCOUNTER FOR OTHER PREPROCEDURAL EXAMINATION: ICD-10-CM

## 2024-06-11 DIAGNOSIS — Z98.890 OTHER SPECIFIED POSTPROCEDURAL STATES: Chronic | ICD-10-CM

## 2024-06-11 DIAGNOSIS — R10.2 PELVIC AND PERINEAL PAIN: ICD-10-CM

## 2024-06-11 PROCEDURE — 36415 COLL VENOUS BLD VENIPUNCTURE: CPT

## 2024-06-11 PROCEDURE — 86901 BLOOD TYPING SEROLOGIC RH(D): CPT

## 2024-06-11 PROCEDURE — 86850 RBC ANTIBODY SCREEN: CPT

## 2024-06-11 PROCEDURE — 86900 BLOOD TYPING SEROLOGIC ABO: CPT

## 2024-06-11 PROCEDURE — 87086 URINE CULTURE/COLONY COUNT: CPT

## 2024-06-11 PROCEDURE — G0463: CPT

## 2024-06-11 PROCEDURE — 81001 URINALYSIS AUTO W/SCOPE: CPT

## 2024-06-11 PROCEDURE — 80177 DRUG SCRN QUAN LEVETIRACETAM: CPT

## 2024-06-11 PROCEDURE — 85027 COMPLETE CBC AUTOMATED: CPT

## 2024-06-11 PROCEDURE — 84702 CHORIONIC GONADOTROPIN TEST: CPT

## 2024-06-11 PROCEDURE — 80048 BASIC METABOLIC PNL TOTAL CA: CPT

## 2024-06-11 NOTE — H&P PST ADULT - NSANTHOSAYNRD_GEN_A_CORE
neck inches/No. GEMA screening performed.  STOP BANG Legend: 0-2 = LOW Risk; 3-4 = INTERMEDIATE Risk; 5-8 = HIGH Risk neck 14.5 inches/No. GEMA screening performed.  STOP BANG Legend: 0-2 = LOW Risk; 3-4 = INTERMEDIATE Risk; 5-8 = HIGH Risk

## 2024-06-11 NOTE — H&P PST ADULT - NEUROLOGICAL
sensation intact/responds to pain/responds to verbal commands/no spontaneous movement negative details…

## 2024-06-11 NOTE — H&P PST ADULT - ATTENDING COMMENTS
SEVERAL DISCUSSIONS WITH THE PATIENT HAVE CENTERED AROUND SUBTOTAL HYSTERECTOMY. THE CARE PLAN IS AND HAS BEEN A SUBTOTAL HYSTERECTOMY WITH BILATERAL SALPINGECTOMY.

## 2024-06-11 NOTE — H&P PST ADULT - HISTORY OF PRESENT ILLNESS
Patient is a [age] year old M/F with PMHx of __ or no pertinent medical history presents for perioperative testing for robotic myomectomy with Dr. Beasley scheduled for /2024. Reports___. Denies ___ or any acute symptoms at this time. Patient otherwise feels well overall.    Patient is a 53 year old F presents for perioperative testing for robotic myomectomy with Dr. Beasley scheduled for 06/19/2024. Reports heavy menses and was told she has a large fibroid on her uterus and an ovarian cyst that needs to be removed. Denies dysuria or any acute symptoms at this time. Patient otherwise feels well overall.

## 2024-06-11 NOTE — H&P PST ADULT - NSICDXPASTSURGICALHX_GEN_ALL_CORE_FT
PAST SURGICAL HISTORY:  History of brain surgery     History of breast surgery     History of ectopic pregnancy     History of hemorrhoidectomy     History of lumpectomy      Mastoid Interpolation Flap Text: A decision was made to reconstruct the defect utilizing an interpolation axial flap and a staged reconstruction.  A telfa template was made of the defect.  This telfa template was then used to outline the mastoid interpolation flap.  The donor area for the pedicle flap was then injected with anesthesia.  The flap was excised through the skin and subcutaneous tissue down to the layer of the underlying musculature.  The pedicle flap was carefully excised within this deep plane to maintain its blood supply.  The edges of the donor site were undermined.   The donor site was closed in a primary fashion.  The pedicle was then rotated into position and sutured.  Once the tube was sutured into place, adequate blood supply was confirmed with blanching and refill.  The pedicle was then wrapped with xeroform gauze and dressed appropriately with a telfa and gauze bandage to ensure continued blood supply and protect the attached pedicle.

## 2024-06-11 NOTE — H&P PST ADULT - PROBLEM SELECTOR PLAN 1
Patient provided with pre-operative instructions and verbalized understanding.  Patient can take ________ but otherwise will be NPO on day of surgery. Patient will stop NSAIDs, aspirin, herbal supplements or vitamins 1 week prior to surgery.  Chlorhexidine wash provided with instructions, verbalized understanding.  Pending medical clearance as per surgeon. Patient provided with pre-operative instructions and verbalized understanding.  Patient can take keppra but otherwise will be NPO on day of surgery. Patient will stop NSAIDs, aspirin, herbal supplements or vitamins 1 week prior to surgery.  Chlorhexidine wash provided with instructions, verbalized understanding.      epilepsy clearance in chart as per surgeon.

## 2024-06-11 NOTE — H&P PST ADULT - ENDOCRINE
Symptoms    Describe your symptoms: right eye is pink, itchy, irritated, drainage, cold symptoms    Any pain: No    How long have you been having symptoms: 1  days    Have you been seen for this:  No    Appointment offered?: No    Triage offered?: No        [facility]    Okay to leave a detailed message?: Yes at Home number on file [phone] (home)     negative

## 2024-06-18 ENCOUNTER — TRANSCRIPTION ENCOUNTER (OUTPATIENT)
Age: 54
End: 2024-06-18

## 2024-06-18 DIAGNOSIS — G89.18 OTHER ACUTE POSTPROCEDURAL PAIN: ICD-10-CM

## 2024-06-18 RX ORDER — HYDROMORPHONE HYDROCHLORIDE 2 MG/1
2 TABLET ORAL
Qty: 30 | Refills: 0 | Status: ACTIVE | COMMUNITY
Start: 2024-06-18 | End: 1900-01-01

## 2024-06-19 ENCOUNTER — APPOINTMENT (OUTPATIENT)
Dept: OBGYN | Facility: HOSPITAL | Age: 54
End: 2024-06-19

## 2024-06-19 ENCOUNTER — OUTPATIENT (OUTPATIENT)
Dept: OUTPATIENT SERVICES | Facility: HOSPITAL | Age: 54
LOS: 1 days | End: 2024-06-19
Payer: COMMERCIAL

## 2024-06-19 ENCOUNTER — RESULT REVIEW (OUTPATIENT)
Age: 54
End: 2024-06-19

## 2024-06-19 ENCOUNTER — TRANSCRIPTION ENCOUNTER (OUTPATIENT)
Age: 54
End: 2024-06-19

## 2024-06-19 VITALS
TEMPERATURE: 97 F | HEART RATE: 64 BPM | OXYGEN SATURATION: 97 % | RESPIRATION RATE: 14 BRPM | SYSTOLIC BLOOD PRESSURE: 139 MMHG | DIASTOLIC BLOOD PRESSURE: 88 MMHG | HEIGHT: 66 IN | WEIGHT: 197.09 LBS

## 2024-06-19 VITALS
DIASTOLIC BLOOD PRESSURE: 85 MMHG | HEART RATE: 78 BPM | SYSTOLIC BLOOD PRESSURE: 133 MMHG | OXYGEN SATURATION: 100 % | TEMPERATURE: 97 F | RESPIRATION RATE: 15 BRPM

## 2024-06-19 DIAGNOSIS — Z01.818 ENCOUNTER FOR OTHER PREPROCEDURAL EXAMINATION: ICD-10-CM

## 2024-06-19 DIAGNOSIS — Z98.890 OTHER SPECIFIED POSTPROCEDURAL STATES: Chronic | ICD-10-CM

## 2024-06-19 DIAGNOSIS — D25.1 INTRAMURAL LEIOMYOMA OF UTERUS: ICD-10-CM

## 2024-06-19 DIAGNOSIS — R10.2 PELVIC AND PERINEAL PAIN: ICD-10-CM

## 2024-06-19 DIAGNOSIS — Z87.59 PERSONAL HISTORY OF OTHER COMPLICATIONS OF PREGNANCY, CHILDBIRTH AND THE PUERPERIUM: Chronic | ICD-10-CM

## 2024-06-19 LAB
ABO RH CONFIRMATION: SIGNIFICANT CHANGE UP
HCT VFR BLD CALC: 36.1 % — SIGNIFICANT CHANGE UP (ref 34.5–45)
HGB BLD-MCNC: 11.2 G/DL — LOW (ref 11.5–15.5)
MCHC RBC-ENTMCNC: 30 PG — SIGNIFICANT CHANGE UP (ref 27–34)
MCHC RBC-ENTMCNC: 31 GM/DL — LOW (ref 32–36)
MCV RBC AUTO: 96.8 FL — SIGNIFICANT CHANGE UP (ref 80–100)
NRBC # BLD: 0 /100 WBCS — SIGNIFICANT CHANGE UP (ref 0–0)
PLATELET # BLD AUTO: 170 K/UL — SIGNIFICANT CHANGE UP (ref 150–400)
RBC # BLD: 3.73 M/UL — LOW (ref 3.8–5.2)
RBC # FLD: 13.8 % — SIGNIFICANT CHANGE UP (ref 10.3–14.5)
WBC # BLD: 11.08 K/UL — HIGH (ref 3.8–10.5)
WBC # FLD AUTO: 11.08 K/UL — HIGH (ref 3.8–10.5)

## 2024-06-19 PROCEDURE — C9399: CPT

## 2024-06-19 PROCEDURE — 36415 COLL VENOUS BLD VENIPUNCTURE: CPT

## 2024-06-19 PROCEDURE — 58544 LSH W/T/O UTERUS ABOVE 250 G: CPT | Mod: AS

## 2024-06-19 PROCEDURE — S2900 ROBOTIC SURGICAL SYSTEM: CPT | Mod: NC

## 2024-06-19 PROCEDURE — C1765: CPT

## 2024-06-19 PROCEDURE — 58544 LSH W/T/O UTERUS ABOVE 250 G: CPT

## 2024-06-19 PROCEDURE — C1889: CPT

## 2024-06-19 PROCEDURE — S2900: CPT

## 2024-06-19 PROCEDURE — 85027 COMPLETE CBC AUTOMATED: CPT

## 2024-06-19 PROCEDURE — 58573 TLH W/T/O UTERUS OVER 250 G: CPT

## 2024-06-19 PROCEDURE — 88307 TISSUE EXAM BY PATHOLOGIST: CPT | Mod: 26

## 2024-06-19 PROCEDURE — 88307 TISSUE EXAM BY PATHOLOGIST: CPT

## 2024-06-19 DEVICE — SURGICEL FIBRILLAR 4 X 4": Type: IMPLANTABLE DEVICE | Status: FUNCTIONAL

## 2024-06-19 DEVICE — VISTASEAL FIBRIN HUMAN 4ML: Type: IMPLANTABLE DEVICE | Status: FUNCTIONAL

## 2024-06-19 DEVICE — INTERCEED 3 X 4": Type: IMPLANTABLE DEVICE | Status: FUNCTIONAL

## 2024-06-19 RX ORDER — OXYCODONE HYDROCHLORIDE 100 MG/5ML
1 SOLUTION ORAL
Qty: 12 | Refills: 0
Start: 2024-06-19

## 2024-06-19 RX ORDER — SIMETHICONE 40MG/0.6ML
80 SUSPENSION, DROPS(FINAL DOSAGE FORM)(ML) ORAL EVERY 6 HOURS
Refills: 0 | Status: DISCONTINUED | OUTPATIENT
Start: 2024-06-19 | End: 2024-07-03

## 2024-06-19 RX ORDER — OXYCODONE HYDROCHLORIDE 100 MG/5ML
5 SOLUTION ORAL
Refills: 0 | Status: DISCONTINUED | OUTPATIENT
Start: 2024-06-19 | End: 2024-06-19

## 2024-06-19 RX ORDER — DEXTROSE MONOHYDRATE AND SODIUM CHLORIDE 5; .3 G/100ML; G/100ML
1000 INJECTION, SOLUTION INTRAVENOUS
Refills: 0 | Status: DISCONTINUED | OUTPATIENT
Start: 2024-06-19 | End: 2024-06-19

## 2024-06-19 RX ORDER — LEVETIRACETAM 250 MG/1
1 TABLET, FILM COATED ORAL
Refills: 0 | DISCHARGE

## 2024-06-19 RX ORDER — HYDROMORPHONE HCL 0.2 MG/ML
1 INJECTION, SOLUTION INTRAVENOUS ONCE
Refills: 0 | Status: DISCONTINUED | OUTPATIENT
Start: 2024-06-19 | End: 2024-06-19

## 2024-06-19 RX ORDER — ONDANSETRON HYDROCHLORIDE 2 MG/ML
8 INJECTION INTRAMUSCULAR; INTRAVENOUS EVERY 8 HOURS
Refills: 0 | Status: DISCONTINUED | OUTPATIENT
Start: 2024-06-19 | End: 2024-07-03

## 2024-06-19 RX ORDER — ACETAMINOPHEN 325 MG
975 TABLET ORAL ONCE
Refills: 0 | Status: COMPLETED | OUTPATIENT
Start: 2024-06-19 | End: 2024-06-19

## 2024-06-19 RX ORDER — GABAPENTIN
600 POWDER (GRAM) MISCELLANEOUS ONCE
Refills: 0 | Status: COMPLETED | OUTPATIENT
Start: 2024-06-19 | End: 2024-06-19

## 2024-06-19 RX ORDER — MAGNESIUM OXIDE 400 MG/1
1 TABLET ORAL
Refills: 0 | DISCHARGE

## 2024-06-19 RX ORDER — CELECOXIB 100 MG/1
400 CAPSULE ORAL ONCE
Refills: 0 | Status: COMPLETED | OUTPATIENT
Start: 2024-06-19 | End: 2024-06-19

## 2024-06-19 RX ORDER — ONDANSETRON HYDROCHLORIDE 2 MG/ML
4 INJECTION INTRAMUSCULAR; INTRAVENOUS ONCE
Refills: 0 | Status: DISCONTINUED | OUTPATIENT
Start: 2024-06-19 | End: 2024-06-19

## 2024-06-19 RX ORDER — ACETAMINOPHEN 325 MG
975 TABLET ORAL EVERY 6 HOURS
Refills: 0 | Status: DISCONTINUED | OUTPATIENT
Start: 2024-06-19 | End: 2024-06-20

## 2024-06-19 RX ORDER — HYDROMORPHONE HCL 0.2 MG/ML
0.5 INJECTION, SOLUTION INTRAVENOUS ONCE
Refills: 0 | Status: DISCONTINUED | OUTPATIENT
Start: 2024-06-19 | End: 2024-06-19

## 2024-06-19 RX ORDER — ACETAMINOPHEN 325 MG
975 TABLET ORAL EVERY 6 HOURS
Refills: 0 | Status: DISCONTINUED | OUTPATIENT
Start: 2024-06-20 | End: 2024-07-03

## 2024-06-19 RX ORDER — HEPARIN SODIUM 50 [USP'U]/ML
5000 INJECTION, SOLUTION INTRAVENOUS ONCE
Refills: 0 | Status: COMPLETED | OUTPATIENT
Start: 2024-06-19 | End: 2024-06-19

## 2024-06-19 RX ORDER — ACETAMINOPHEN 325 MG
1000 TABLET ORAL EVERY 6 HOURS
Refills: 0 | Status: COMPLETED | OUTPATIENT
Start: 2024-06-19 | End: 2025-05-18

## 2024-06-19 RX ADMIN — CELECOXIB 400 MILLIGRAM(S): 100 CAPSULE ORAL at 06:35

## 2024-06-19 RX ADMIN — HYDROMORPHONE HCL 1 MILLIGRAM(S): 0.2 INJECTION, SOLUTION INTRAVENOUS at 10:29

## 2024-06-19 RX ADMIN — Medication 600 MILLIGRAM(S): at 06:35

## 2024-06-19 RX ADMIN — HYDROMORPHONE HCL 1 MILLIGRAM(S): 0.2 INJECTION, SOLUTION INTRAVENOUS at 10:15

## 2024-06-19 RX ADMIN — HEPARIN SODIUM 5000 UNIT(S): 50 INJECTION, SOLUTION INTRAVENOUS at 06:34

## 2024-06-19 RX ADMIN — Medication 975 MILLIGRAM(S): at 06:35

## 2024-06-19 RX ADMIN — DEXTROSE MONOHYDRATE AND SODIUM CHLORIDE 50 MILLILITER(S): 5; .3 INJECTION, SOLUTION INTRAVENOUS at 06:34

## 2024-06-27 LAB — SURGICAL PATHOLOGY STUDY: SIGNIFICANT CHANGE UP

## 2024-07-01 ENCOUNTER — APPOINTMENT (OUTPATIENT)
Dept: OBGYN | Facility: CLINIC | Age: 54
End: 2024-07-01

## 2024-07-01 VITALS
SYSTOLIC BLOOD PRESSURE: 112 MMHG | BODY MASS INDEX: 30.53 KG/M2 | DIASTOLIC BLOOD PRESSURE: 64 MMHG | WEIGHT: 190 LBS | HEIGHT: 66 IN

## 2024-07-01 PROBLEM — D25.9 LEIOMYOMA OF UTERUS, UNSPECIFIED: Chronic | Status: ACTIVE | Noted: 2024-06-19

## 2024-07-01 PROBLEM — Z86.69 PERSONAL HISTORY OF OTHER DISEASES OF THE NERVOUS SYSTEM AND SENSE ORGANS: Chronic | Status: ACTIVE | Noted: 2024-06-19

## 2024-07-01 PROCEDURE — 99024 POSTOP FOLLOW-UP VISIT: CPT

## 2024-08-29 ENCOUNTER — APPOINTMENT (OUTPATIENT)
Dept: RADIOLOGY | Facility: CLINIC | Age: 54
End: 2024-08-29
Payer: COMMERCIAL

## 2024-08-29 ENCOUNTER — APPOINTMENT (OUTPATIENT)
Dept: OTHER | Facility: CLINIC | Age: 54
End: 2024-08-29
Payer: COMMERCIAL

## 2024-08-29 VITALS
HEIGHT: 66 IN | WEIGHT: 195 LBS | OXYGEN SATURATION: 100 % | DIASTOLIC BLOOD PRESSURE: 78 MMHG | BODY MASS INDEX: 31.34 KG/M2 | HEART RATE: 70 BPM | TEMPERATURE: 97.8 F | SYSTOLIC BLOOD PRESSURE: 126 MMHG

## 2024-08-29 DIAGNOSIS — Z04.9 ENCOUNTER FOR EXAMINATION AND OBSERVATION FOR UNSPECIFIED REASON: ICD-10-CM

## 2024-08-29 PROCEDURE — 94010 BREATHING CAPACITY TEST: CPT

## 2024-08-29 PROCEDURE — 71046 X-RAY EXAM CHEST 2 VIEWS: CPT

## 2024-08-29 PROCEDURE — 99396 PREV VISIT EST AGE 40-64: CPT | Mod: 25

## 2024-08-29 NOTE — DISCUSSION/SUMMARY
[Patient seen for WTC Monitoring ___] : Patient was seen for WTC monitoring [unfilled] [Please See Note in Chart and Documentation in Trial DB] : Please see note in chart and documentation in Trial DB. [FreeTextEntry3] : Ms. FORTUNATO JENSEN is a 53   year old female   no longer has GERD/dysphagia- changed diet   L knee pain and slight swelling 2 weeks,   Hx of hitting left knee 2 months ago   gets annual mammogram at [facility]  also had  MRI  breast 04 2024 and follow up with her breast surgeon , who retired and referred her to another breast surgeon  had breast BX 02 2021- intraductal papilloma with apocrine metaplasia   stated that had neg results   Dr. 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

## 2024-08-29 NOTE — HEALTH RISK ASSESSMENT
[Patient reported mammogram was normal] : Patient reported mammogram was normal [Patient reported colonoscopy was normal] : Patient reported colonoscopy was normal [MammogramDate] : 02/2024 [MammogramComments] : also stated that had breast MRI and saw her breast surgeon in april 2024 who retired  [ColonoscopyDate] : 02/2022 [ColonoscopyComments] : melanosis coli

## 2024-09-03 DIAGNOSIS — Z12.9 ENCOUNTER FOR SCREENING FOR MALIGNANT NEOPLASM, SITE UNSPECIFIED: ICD-10-CM

## 2024-09-03 LAB
ALBUMIN SERPL ELPH-MCNC: 3.8 G/DL
ALP BLD-CCNC: 65 U/L
ALT SERPL-CCNC: 15 U/L
ANION GAP SERPL CALC-SCNC: 11 MMOL/L
APPEARANCE: CLEAR
AST SERPL-CCNC: 22 U/L
BACTERIA: NEGATIVE /HPF
BILIRUB SERPL-MCNC: 0.4 MG/DL
BILIRUBIN URINE: NEGATIVE
BLOOD URINE: ABNORMAL
BUN SERPL-MCNC: 8 MG/DL
CALCIUM SERPL-MCNC: 8.8 MG/DL
CAST: 0 /LPF
CHLORIDE SERPL-SCNC: 110 MMOL/L
CHOLEST SERPL-MCNC: 177 MG/DL
CO2 SERPL-SCNC: 21 MMOL/L
COLOR: YELLOW
CREAT SERPL-MCNC: 0.74 MG/DL
EGFR: 98 ML/MIN/1.73M2
EPITHELIAL CELLS: 24 /HPF
GLUCOSE QUALITATIVE U: NEGATIVE MG/DL
GLUCOSE SERPL-MCNC: 82 MG/DL
HCT VFR BLD CALC: 40.4 %
HDLC SERPL-MCNC: 39 MG/DL
HGB BLD-MCNC: 12.6 G/DL
KETONES URINE: NEGATIVE MG/DL
LDLC SERPL CALC-MCNC: 121 MG/DL
LEUKOCYTE ESTERASE URINE: NEGATIVE
MCHC RBC-ENTMCNC: 30.2 PG
MCHC RBC-ENTMCNC: 31.2 GM/DL
MCV RBC AUTO: 96.9 FL
MICROSCOPIC-UA: NORMAL
NITRITE URINE: NEGATIVE
NONHDLC SERPL-MCNC: 138 MG/DL
PH URINE: 5.5
PLATELET # BLD AUTO: 225 K/UL
POTASSIUM SERPL-SCNC: 4.3 MMOL/L
PROT SERPL-MCNC: 6.5 G/DL
PROTEIN URINE: NORMAL MG/DL
RBC # BLD: 4.17 M/UL
RBC # FLD: 14.5 %
RED BLOOD CELLS URINE: 8 /HPF
SODIUM SERPL-SCNC: 141 MMOL/L
SPECIFIC GRAVITY URINE: 1.02
TRIGL SERPL-MCNC: 93 MG/DL
UROBILINOGEN URINE: 0.2 MG/DL
WBC # FLD AUTO: 6.62 K/UL
WHITE BLOOD CELLS URINE: 0 /HPF

## 2024-09-06 ENCOUNTER — RESULT CHARGE (OUTPATIENT)
Age: 54
End: 2024-09-06

## 2024-09-06 ENCOUNTER — APPOINTMENT (OUTPATIENT)
Dept: ORTHOPEDIC SURGERY | Facility: CLINIC | Age: 54
End: 2024-09-06
Payer: COMMERCIAL

## 2024-09-06 VITALS — WEIGHT: 192 LBS | BODY MASS INDEX: 30.86 KG/M2 | HEIGHT: 66 IN

## 2024-09-06 DIAGNOSIS — M25.462 EFFUSION, LEFT KNEE: ICD-10-CM

## 2024-09-06 PROCEDURE — 20611 DRAIN/INJ JOINT/BURSA W/US: CPT | Mod: LT

## 2024-09-06 PROCEDURE — 99204 OFFICE O/P NEW MOD 45 MIN: CPT | Mod: 25

## 2024-09-06 PROCEDURE — 73564 X-RAY EXAM KNEE 4 OR MORE: CPT | Mod: LT

## 2024-09-06 PROCEDURE — J3490M: CUSTOM

## 2024-09-06 RX ORDER — MELOXICAM 15 MG/1
15 TABLET ORAL
Qty: 30 | Refills: 2 | Status: ACTIVE | COMMUNITY
Start: 2024-09-06 | End: 1900-01-01

## 2024-09-06 NOTE — PHYSICAL EXAM
[Left] : left knee [5___] : hamstring 5[unfilled]/5 [] : no erythema [TWNoteComboBox7] : flexion 110 degrees [de-identified] : extension 3 degrees

## 2024-09-06 NOTE — PROCEDURE
[Large Joint Injection] : Large joint injection [Left] : of the left [Knee] : knee [Pain] : pain [Inflammation] : inflammation [Alcohol] : alcohol [Betadine] : betadine [Ethyl Chloride sprayed topically] : ethyl chloride sprayed topically [___ cc    6mg] :  Betamethasone (Celestone) ~Vcc of 6mg [___ cc    1%] : Lidocaine ~Vcc of 1%  [___ cc    0.25%] : Bupivacaine (Marcaine) ~Vcc of 0.25%  [Effusion] : effusion [] : Patient tolerated procedure well [Apply ice for 15min out of every hour for the next 12-24 hours as tolerated] : apply ice for 15 minutes out of every hour for the next 12-24 hours as tolerated [Call if redness, pain or fever occur] : call if redness, pain or fever occur [Patient was advised to rest the joint(s) for ____ days] : patient was advised to rest the joint(s) for [unfilled] days [Patient had decreased mobility in the joint] : patient had decreased mobility in the joint [Risks, benefits, alternatives discussed / Verbal consent obtained] : the risks benefits, and alternatives have been discussed, and verbal consent was obtained [Prior failure or difficult injection] : prior failure or difficult injection [All ultrasound images have been permanently captured and stored accordingly in our picture archiving and communication system] : All ultrasound images have been permanently captured and stored accordingly in our picture archiving and communication system [Visualization of the needle and placement of injection was performed without complication] : visualization of the needle and placement of injection was performed without complication [de-identified] : 45 cc [de-identified] : normal appearing synovial fluid

## 2024-09-06 NOTE — DISCUSSION/SUMMARY
[de-identified] : The patient was advised of the diagnosis. The natural history of the pathology was explained in full to the patient in layman's terms. The risks and benefits of surgical and non-surgical treatment alternatives were explained in full to the patient. All questions were answered.  Aspiration/CSI tolerated well. Rx for Mobic. I advised FORTUNATO that the NSAID should be taken with food.  In addition while taking the prescribed NSAID, no over the counter or other NSAIDs should be used, such as ibuprofen (Motrin or Advil) or naproxen (Aleve) as this can cause stomach upset or other side effects.  If needed for fever or breakthrough pain Tylenol can be used. Follow up in 3-4 weeks.  Progress note completed by PAT Diego under the direct supervision of Orlando Serrano M.D.

## 2024-09-06 NOTE — HISTORY OF PRESENT ILLNESS
[5] : 5 [0] : 0 [Sharp] : sharp [Shooting] : shooting [Intermittent] : intermittent [Household chores] : household chores [Leisure] : leisure [Rest] : rest [Meds] : meds [de-identified] : This is Ms. FORTUNATO JENSEN  a 53 year old female who comes in today complaining of left knee pain. She states her pain has been worsening for the past 2 weeks without recent injury. She notes that she hit her foot coming down stairs 3 months ago. Describes pain and swelling in the knee with limited ROM. Difficulty rising from a seated position. She has been wearing a compression sleeve with some improvement. Denies history of prior injury. [] : no [FreeTextEntry1] : Left knee  [FreeTextEntry7] : Pain radiates into calf [de-identified] : Standing from a sitting position

## 2024-09-06 NOTE — IMAGING
[Left] : left knee [All Views] : anteroposterior, lateral, skyline, and anteroposterior standing [FreeTextEntry9] : mild medial joint space narrowing

## 2024-09-23 ENCOUNTER — APPOINTMENT (OUTPATIENT)
Dept: UROLOGY | Facility: CLINIC | Age: 54
End: 2024-09-23
Payer: COMMERCIAL

## 2024-09-23 VITALS
DIASTOLIC BLOOD PRESSURE: 94 MMHG | HEART RATE: 71 BPM | SYSTOLIC BLOOD PRESSURE: 166 MMHG | RESPIRATION RATE: 16 BRPM | OXYGEN SATURATION: 100 %

## 2024-09-23 DIAGNOSIS — R31.29 OTHER MICROSCOPIC HEMATURIA: ICD-10-CM

## 2024-09-23 PROCEDURE — 99203 OFFICE O/P NEW LOW 30 MIN: CPT

## 2024-09-23 NOTE — ASSESSMENT
[FreeTextEntry1] : 54 yo F with microscopic hematuria  #MH - AUA intermeidate risk by guidelines - Reviewed etiologies - US/cystoscopy

## 2024-09-23 NOTE — HISTORY OF PRESENT ILLNESS
[FreeTextEntry1] : : 1970  Referring Provider: SELF,REFERRED   HPI: Ms. FORTUNATO JENSEN is a 53 year yo F with a PMHx notable for microscopic hematuria. No n/v/f/c. No flank pain. Has a history of fibroids and meningioma. Recently found to have 8 RBC/hpf on August UA, prior 3 and 9 RBC/hpf. No history of smoking. No FHx of RCC/TCC in family. Currently perimenopausal, LMP in 2024   Anticoagulation: None All: NKDA Social:  nonsmoker PMHx: fibroids FHx:  NC PSHx: partial hysterectomy Labs: None, UA as above  Imaging: As above [Urinary Incontinence] : no urinary incontinence [Urinary Retention] : no urinary retention [Urinary Urgency] : no urinary urgency [Urinary Frequency] : no urinary frequency

## 2024-09-27 ENCOUNTER — APPOINTMENT (OUTPATIENT)
Dept: ORTHOPEDIC SURGERY | Facility: CLINIC | Age: 54
End: 2024-09-27
Payer: COMMERCIAL

## 2024-09-27 VITALS — HEIGHT: 66 IN | WEIGHT: 192 LBS | BODY MASS INDEX: 30.86 KG/M2

## 2024-09-27 DIAGNOSIS — M25.462 EFFUSION, LEFT KNEE: ICD-10-CM

## 2024-09-27 DIAGNOSIS — M23.92 UNSPECIFIED INTERNAL DERANGEMENT OF LEFT KNEE: ICD-10-CM

## 2024-09-27 PROCEDURE — 99203 OFFICE O/P NEW LOW 30 MIN: CPT

## 2024-09-27 NOTE — PHYSICAL EXAM
[Left] : left knee [5___] : hamstring 5[unfilled]/5 [Equivocal] : equivocal Johanny [] : mildly antalgic [[id number]] : flexion 115 degrees [de-identified] : extension 3 degrees

## 2024-09-27 NOTE — HISTORY OF PRESENT ILLNESS
[1] : 2 [0] : 0 [Burning] : burning [Dull/Aching] : dull/aching [Shooting] : shooting [Intermittent] : intermittent [Household chores] : household chores [Leisure] : leisure [Rest] : rest [Meds] : meds [de-identified] : Patient is here today for a follow up for the left knee. She reports improvement from the aspiration/CSI performed at the last visit.  [] : no [FreeTextEntry1] : Left knee  [de-identified] : Standing from a sitting position

## 2024-09-27 NOTE — DISCUSSION/SUMMARY
[de-identified] : The patient was advised of the diagnosis. The natural history of the pathology was explained in full to the patient in layman's terms. The risks and benefits of surgical and non-surgical treatment alternatives were explained in full to the patient. All questions were answered.  she has cont fluid  she will get a mri to eval for tear / loose body f/u after mri

## 2024-10-04 ENCOUNTER — APPOINTMENT (OUTPATIENT)
Dept: OBGYN | Facility: CLINIC | Age: 54
End: 2024-10-04

## 2024-10-04 VITALS — DIASTOLIC BLOOD PRESSURE: 82 MMHG | BODY MASS INDEX: 30.99 KG/M2 | WEIGHT: 192 LBS | SYSTOLIC BLOOD PRESSURE: 140 MMHG

## 2024-10-04 DIAGNOSIS — Z09 ENCOUNTER FOR FOLLOW-UP EXAMINATION AFTER COMPLETED TREATMENT FOR CONDITIONS OTHER THAN MALIGNANT NEOPLASM: ICD-10-CM

## 2024-10-04 PROCEDURE — 99212 OFFICE O/P EST SF 10 MIN: CPT

## 2024-10-07 PROBLEM — Z09 POSTOPERATIVE EXAMINATION: Status: ACTIVE | Noted: 2024-10-07

## 2024-10-07 NOTE — PHYSICAL EXAM
[Appropriately responsive] : appropriately responsive [Alert] : alert [No Acute Distress] : no acute distress [Oriented x3] : oriented x3 [Labia Majora] : normal [Labia Minora] : normal [Normal] : normal [Absent] : absent

## 2024-10-07 NOTE — HISTORY OF PRESENT ILLNESS
[FreeTextEntry1] : 54 YO F PRESENTS FOR FOLLOW UP S/P PARTIAL HYSTERECTOMY 3 MONTHS AGO. PT IS DOING WELL AND OFFERS NO COMPLAINTS. HAS NOT HAD ANY ABNORMAL BLEEDING OR PELVIC DISCOMFORT SINCE.

## 2024-10-07 NOTE — PLAN
[FreeTextEntry1] : PT DOING WELL FOLLOWING SURGERY.  PELVIC EXAM WITHIN NORMAL LIMITS FOLLOW UP FOR ANNUAL EXAM OR PRN 17

## 2024-10-15 ENCOUNTER — OUTPATIENT (OUTPATIENT)
Dept: OUTPATIENT SERVICES | Facility: HOSPITAL | Age: 54
LOS: 1 days | End: 2024-10-15
Payer: COMMERCIAL

## 2024-10-15 ENCOUNTER — APPOINTMENT (OUTPATIENT)
Dept: ULTRASOUND IMAGING | Facility: IMAGING CENTER | Age: 54
End: 2024-10-15
Payer: COMMERCIAL

## 2024-10-15 DIAGNOSIS — Z98.890 OTHER SPECIFIED POSTPROCEDURAL STATES: Chronic | ICD-10-CM

## 2024-10-15 DIAGNOSIS — R31.29 OTHER MICROSCOPIC HEMATURIA: ICD-10-CM

## 2024-10-15 DIAGNOSIS — Z87.59 PERSONAL HISTORY OF OTHER COMPLICATIONS OF PREGNANCY, CHILDBIRTH AND THE PUERPERIUM: Chronic | ICD-10-CM

## 2024-10-15 PROCEDURE — 76775 US EXAM ABDO BACK WALL LIM: CPT | Mod: 26

## 2024-10-15 PROCEDURE — 76775 US EXAM ABDO BACK WALL LIM: CPT

## 2024-10-16 ENCOUNTER — APPOINTMENT (OUTPATIENT)
Dept: MRI IMAGING | Facility: CLINIC | Age: 54
End: 2024-10-16
Payer: COMMERCIAL

## 2024-10-16 PROCEDURE — 73721 MRI JNT OF LWR EXTRE W/O DYE: CPT | Mod: LT

## 2024-10-18 ENCOUNTER — TRANSCRIPTION ENCOUNTER (OUTPATIENT)
Age: 54
End: 2024-10-18

## 2024-10-22 ENCOUNTER — OUTPATIENT (OUTPATIENT)
Dept: OUTPATIENT SERVICES | Facility: HOSPITAL | Age: 54
LOS: 1 days | End: 2024-10-22
Payer: COMMERCIAL

## 2024-10-22 ENCOUNTER — APPOINTMENT (OUTPATIENT)
Dept: UROLOGY | Facility: CLINIC | Age: 54
End: 2024-10-22
Payer: COMMERCIAL

## 2024-10-22 DIAGNOSIS — R35.0 FREQUENCY OF MICTURITION: ICD-10-CM

## 2024-10-22 DIAGNOSIS — Z98.890 OTHER SPECIFIED POSTPROCEDURAL STATES: Chronic | ICD-10-CM

## 2024-10-22 DIAGNOSIS — Z87.59 PERSONAL HISTORY OF OTHER COMPLICATIONS OF PREGNANCY, CHILDBIRTH AND THE PUERPERIUM: Chronic | ICD-10-CM

## 2024-10-22 DIAGNOSIS — R31.29 OTHER MICROSCOPIC HEMATURIA: ICD-10-CM

## 2024-10-22 PROCEDURE — 52000 CYSTOURETHROSCOPY: CPT

## 2024-10-24 DIAGNOSIS — R31.29 OTHER MICROSCOPIC HEMATURIA: ICD-10-CM

## 2024-10-25 ENCOUNTER — APPOINTMENT (OUTPATIENT)
Dept: ORTHOPEDIC SURGERY | Facility: CLINIC | Age: 54
End: 2024-10-25

## 2024-11-07 ENCOUNTER — APPOINTMENT (OUTPATIENT)
Dept: ORTHOPEDIC SURGERY | Facility: CLINIC | Age: 54
End: 2024-11-07

## 2024-11-07 VITALS — HEIGHT: 66 IN | BODY MASS INDEX: 30.86 KG/M2 | WEIGHT: 192 LBS

## 2024-11-07 DIAGNOSIS — M23.92 UNSPECIFIED INTERNAL DERANGEMENT OF LEFT KNEE: ICD-10-CM

## 2024-11-07 DIAGNOSIS — M22.42 CHONDROMALACIA PATELLAE, LEFT KNEE: ICD-10-CM

## 2024-11-07 DIAGNOSIS — S83.8X2D SPRAIN OF OTHER SPECIFIED PARTS OF LEFT KNEE, SUBSEQUENT ENCOUNTER: ICD-10-CM

## 2024-11-07 DIAGNOSIS — M65.969 UNSP SYNOVITIS AND TENOSYNOVITIS, UNSPECIFIED LOWER LEG: ICD-10-CM

## 2024-11-07 DIAGNOSIS — M25.462 EFFUSION, LEFT KNEE: ICD-10-CM

## 2024-11-07 PROCEDURE — J3490M: CUSTOM

## 2024-11-07 PROCEDURE — 99214 OFFICE O/P EST MOD 30 MIN: CPT | Mod: 25

## 2024-11-07 PROCEDURE — 20611 DRAIN/INJ JOINT/BURSA W/US: CPT | Mod: LT

## 2024-12-03 ENCOUNTER — APPOINTMENT (OUTPATIENT)
Dept: NEPHROLOGY | Facility: CLINIC | Age: 54
End: 2024-12-03
Payer: COMMERCIAL

## 2024-12-03 VITALS — DIASTOLIC BLOOD PRESSURE: 87 MMHG | HEART RATE: 69 BPM | SYSTOLIC BLOOD PRESSURE: 150 MMHG

## 2024-12-03 VITALS — DIASTOLIC BLOOD PRESSURE: 78 MMHG | SYSTOLIC BLOOD PRESSURE: 120 MMHG

## 2024-12-03 DIAGNOSIS — Z78.9 OTHER SPECIFIED HEALTH STATUS: ICD-10-CM

## 2024-12-03 DIAGNOSIS — R31.29 OTHER MICROSCOPIC HEMATURIA: ICD-10-CM

## 2024-12-03 PROCEDURE — 99205 OFFICE O/P NEW HI 60 MIN: CPT

## 2024-12-03 PROCEDURE — G2211 COMPLEX E/M VISIT ADD ON: CPT | Mod: NC

## 2024-12-04 LAB
APPEARANCE: CLEAR
BACTERIA: NEGATIVE /HPF
BILIRUBIN URINE: NEGATIVE
BLOOD URINE: ABNORMAL
CAST: 0 /LPF
COLOR: YELLOW
CREAT SPEC-SCNC: 212 MG/DL
CREAT/PROT UR: 0.1 RATIO
EPITHELIAL CELLS: 7 /HPF
GLUCOSE QUALITATIVE U: NEGATIVE MG/DL
KETONES URINE: NEGATIVE MG/DL
LEUKOCYTE ESTERASE URINE: ABNORMAL
MICROSCOPIC-UA: NORMAL
NITRITE URINE: NEGATIVE
PH URINE: 5.5
PROT UR-MCNC: 11 MG/DL
PROTEIN URINE: NORMAL MG/DL
RED BLOOD CELLS URINE: 2 /HPF
REVIEW: NORMAL
SPECIFIC GRAVITY URINE: 1.03
UROBILINOGEN URINE: 0.2 MG/DL
WHITE BLOOD CELLS URINE: 2 /HPF

## 2024-12-09 ENCOUNTER — NON-APPOINTMENT (OUTPATIENT)
Age: 54
End: 2024-12-09

## 2024-12-09 ENCOUNTER — APPOINTMENT (OUTPATIENT)
Dept: CARDIOLOGY | Facility: CLINIC | Age: 54
End: 2024-12-09
Payer: COMMERCIAL

## 2024-12-09 VITALS
WEIGHT: 196 LBS | HEART RATE: 93 BPM | BODY MASS INDEX: 31.64 KG/M2 | DIASTOLIC BLOOD PRESSURE: 90 MMHG | OXYGEN SATURATION: 100 % | SYSTOLIC BLOOD PRESSURE: 140 MMHG

## 2024-12-09 PROCEDURE — 99213 OFFICE O/P EST LOW 20 MIN: CPT | Mod: 25

## 2024-12-09 PROCEDURE — 93000 ELECTROCARDIOGRAM COMPLETE: CPT

## 2024-12-19 ENCOUNTER — APPOINTMENT (OUTPATIENT)
Dept: ORTHOPEDIC SURGERY | Facility: CLINIC | Age: 54
End: 2024-12-19
Payer: COMMERCIAL

## 2024-12-19 VITALS — BODY MASS INDEX: 31.18 KG/M2 | HEIGHT: 66 IN | WEIGHT: 194 LBS

## 2024-12-19 DIAGNOSIS — M65.969 UNSP SYNOVITIS AND TENOSYNOVITIS, UNSPECIFIED LOWER LEG: ICD-10-CM

## 2024-12-19 DIAGNOSIS — M25.462 EFFUSION, LEFT KNEE: ICD-10-CM

## 2024-12-19 DIAGNOSIS — M22.42 CHONDROMALACIA PATELLAE, LEFT KNEE: ICD-10-CM

## 2024-12-19 DIAGNOSIS — M23.92 UNSPECIFIED INTERNAL DERANGEMENT OF LEFT KNEE: ICD-10-CM

## 2024-12-19 PROCEDURE — 99213 OFFICE O/P EST LOW 20 MIN: CPT | Mod: 25

## 2024-12-19 PROCEDURE — 20610 DRAIN/INJ JOINT/BURSA W/O US: CPT | Mod: LT

## 2025-01-13 ENCOUNTER — APPOINTMENT (OUTPATIENT)
Dept: ORTHOPEDIC SURGERY | Facility: CLINIC | Age: 55
End: 2025-01-13
Payer: COMMERCIAL

## 2025-01-13 VITALS — BODY MASS INDEX: 31.18 KG/M2 | WEIGHT: 194 LBS | HEIGHT: 66 IN

## 2025-01-13 DIAGNOSIS — M23.92 UNSPECIFIED INTERNAL DERANGEMENT OF LEFT KNEE: ICD-10-CM

## 2025-01-13 PROCEDURE — 20611 DRAIN/INJ JOINT/BURSA W/US: CPT | Mod: LT

## 2025-01-23 ENCOUNTER — APPOINTMENT (OUTPATIENT)
Dept: ORTHOPEDIC SURGERY | Facility: CLINIC | Age: 55
End: 2025-01-23

## 2025-01-23 VITALS — HEIGHT: 66 IN | WEIGHT: 194 LBS | BODY MASS INDEX: 31.18 KG/M2

## 2025-01-23 PROCEDURE — 20611 DRAIN/INJ JOINT/BURSA W/US: CPT | Mod: LT

## 2025-01-23 PROCEDURE — 99212 OFFICE O/P EST SF 10 MIN: CPT | Mod: 25

## 2025-01-30 ENCOUNTER — APPOINTMENT (OUTPATIENT)
Dept: ORTHOPEDIC SURGERY | Facility: CLINIC | Age: 55
End: 2025-01-30

## 2025-01-30 DIAGNOSIS — M22.42 CHONDROMALACIA PATELLAE, LEFT KNEE: ICD-10-CM

## 2025-01-30 PROCEDURE — 20611 DRAIN/INJ JOINT/BURSA W/US: CPT | Mod: LT

## 2025-02-06 DIAGNOSIS — Z12.9 ENCOUNTER FOR SCREENING FOR MALIGNANT NEOPLASM, SITE UNSPECIFIED: ICD-10-CM

## 2025-02-10 ENCOUNTER — NON-APPOINTMENT (OUTPATIENT)
Age: 55
End: 2025-02-10

## 2025-02-10 ENCOUNTER — APPOINTMENT (OUTPATIENT)
Dept: OBGYN | Facility: CLINIC | Age: 55
End: 2025-02-10
Payer: COMMERCIAL

## 2025-02-10 VITALS
SYSTOLIC BLOOD PRESSURE: 130 MMHG | HEIGHT: 66 IN | DIASTOLIC BLOOD PRESSURE: 60 MMHG | BODY MASS INDEX: 31.98 KG/M2 | WEIGHT: 199 LBS

## 2025-02-10 DIAGNOSIS — Z01.419 ENCOUNTER FOR GYNECOLOGICAL EXAMINATION (GENERAL) (ROUTINE) W/OUT ABNORMAL FINDINGS: ICD-10-CM

## 2025-02-10 LAB
CARD LOT #: NORMAL
CARD LOT EXP DATE: NORMAL
DATE COLLECTED: NORMAL
DATE COLLECTED: NORMAL
DEVELOPER LOT #: NORMAL
DEVELOPER LOT EXP DATE: NORMAL
HEMOCCULT 2: NEGATIVE
HEMOCCULT SP1 STL QL: NEGATIVE
QUALITY CONTROL: YES
QUALITY CONTROL: YES

## 2025-02-10 PROCEDURE — 99396 PREV VISIT EST AGE 40-64: CPT

## 2025-02-10 RX ORDER — ESTRADIOL 1 MG/G
1 GEL TOPICAL
Qty: 1 | Refills: 11 | Status: ACTIVE | COMMUNITY
Start: 2025-02-10 | End: 1900-01-01

## 2025-02-12 LAB — CYTOLOGY CVX/VAG DOC THIN PREP: NORMAL

## 2025-03-03 ENCOUNTER — LABORATORY RESULT (OUTPATIENT)
Age: 55
End: 2025-03-03

## 2025-03-03 ENCOUNTER — APPOINTMENT (OUTPATIENT)
Dept: NEPHROLOGY | Facility: CLINIC | Age: 55
End: 2025-03-03
Payer: COMMERCIAL

## 2025-03-03 VITALS
SYSTOLIC BLOOD PRESSURE: 113 MMHG | HEIGHT: 66 IN | WEIGHT: 199 LBS | BODY MASS INDEX: 31.98 KG/M2 | TEMPERATURE: 98.4 F | HEART RATE: 107 BPM | DIASTOLIC BLOOD PRESSURE: 69 MMHG | OXYGEN SATURATION: 97 %

## 2025-03-03 DIAGNOSIS — R31.29 OTHER MICROSCOPIC HEMATURIA: ICD-10-CM

## 2025-03-03 PROCEDURE — 99215 OFFICE O/P EST HI 40 MIN: CPT

## 2025-03-03 PROCEDURE — G2211 COMPLEX E/M VISIT ADD ON: CPT

## 2025-03-06 LAB
ALBUMIN MFR SERPL ELPH: 58.2 %
ALBUMIN SERPL ELPH-MCNC: 4.1 G/DL
ALBUMIN SERPL-MCNC: 4 G/DL
ALBUMIN/GLOB SERPL: 1.4 RATIO
ALP BLD-CCNC: 83 U/L
ALPHA1 GLOB MFR SERPL ELPH: 4.2 %
ALPHA1 GLOB SERPL ELPH-MCNC: 0.3 G/DL
ALPHA2 GLOB MFR SERPL ELPH: 11 %
ALPHA2 GLOB SERPL ELPH-MCNC: 0.7 G/DL
ALT SERPL-CCNC: 18 U/L
ANA SER IF-ACNC: NEGATIVE
ANION GAP SERPL CALC-SCNC: 14 MMOL/L
APPEARANCE: CLEAR
AST SERPL-CCNC: 13 U/L
B-GLOBULIN MFR SERPL ELPH: 13.9 %
B-GLOBULIN SERPL ELPH-MCNC: 0.9 G/DL
BASOPHILS # BLD AUTO: 0.07 K/UL
BASOPHILS NFR BLD AUTO: 1.2 %
BILIRUB SERPL-MCNC: 0.4 MG/DL
BILIRUBIN URINE: NEGATIVE
BLOOD URINE: ABNORMAL
BUN SERPL-MCNC: 13 MG/DL
C3 SERPL-MCNC: 183 MG/DL
C4 SERPL-MCNC: 41 MG/DL
CALCIUM SERPL-MCNC: 9.6 MG/DL
CHLORIDE SERPL-SCNC: 104 MMOL/L
CO2 SERPL-SCNC: 25 MMOL/L
COLOR: YELLOW
CREAT SERPL-MCNC: 0.89 MG/DL
CREAT SPEC-SCNC: 217 MG/DL
CREAT/PROT UR: 0 RATIO
DEPRECATED KAPPA LC FREE/LAMBDA SER: 1.32 RATIO
EGFRCR SERPLBLD CKD-EPI 2021: 78 ML/MIN/1.73M2
EOSINOPHIL # BLD AUTO: 0.21 K/UL
EOSINOPHIL NFR BLD AUTO: 3.6 %
ERYTHROCYTE [SEDIMENTATION RATE] IN BLOOD BY WESTERGREN METHOD: 31 MM/HR
FREE KAPPA URINE: 6.98 MG/L
FREE KAPPA/LAMDA RATIO: 4.2 RATIO
FREE LAMDA URINE: 1.66 MG/L
GAMMA GLOB FLD ELPH-MCNC: 0.9 G/DL
GAMMA GLOB MFR SERPL ELPH: 12.7 %
GLUCOSE QUALITATIVE U: NEGATIVE MG/DL
GLUCOSE SERPL-MCNC: 90 MG/DL
HAV IGM SER QL: NONREACTIVE
HBV CORE IGM SER QL: NONREACTIVE
HBV SURFACE AG SER QL: NONREACTIVE
HCT VFR BLD CALC: 42.2 %
HCV AB SER QL: NONREACTIVE
HCV S/CO RATIO: 0.06 S/CO
HGB A MFR BLD: 97.2 %
HGB A2 MFR BLD: 2.8 %
HGB BLD-MCNC: 13.9 G/DL
HGB FRACT BLD-IMP: NORMAL
HIV1+2 AB SPEC QL IA.RAPID: NONREACTIVE
IGA SER QL IEP: 138 MG/DL
IGG SER QL IEP: 952 MG/DL
IGM SER QL IEP: 68 MG/DL
IMM GRANULOCYTES NFR BLD AUTO: 0.2 %
INR PPP: 0.92 RATIO
INTERPRETATION SERPL IEP-IMP: NORMAL
KAPPA LC 24H UR QL: NORMAL
KAPPA LC CSF-MCNC: 1.1 MG/DL
KAPPA LC SERPL-MCNC: 1.45 MG/DL
KETONES URINE: NEGATIVE MG/DL
LEUKOCYTE ESTERASE URINE: NEGATIVE
LYMPHOCYTES # BLD AUTO: 2.18 K/UL
LYMPHOCYTES NFR BLD AUTO: 37.7 %
M PROTEIN SPEC IFE-MCNC: NORMAL
MAN DIFF?: NORMAL
MCHC RBC-ENTMCNC: 31.2 PG
MCHC RBC-ENTMCNC: 32.9 G/DL
MCV RBC AUTO: 94.8 FL
MONOCYTES # BLD AUTO: 0.34 K/UL
MONOCYTES NFR BLD AUTO: 5.9 %
NEUTROPHILS # BLD AUTO: 2.98 K/UL
NEUTROPHILS NFR BLD AUTO: 51.4 %
NITRITE URINE: NEGATIVE
PH URINE: 5.5
PLATELET # BLD AUTO: 245 K/UL
POTASSIUM SERPL-SCNC: 4.2 MMOL/L
PROT SERPL-MCNC: 6.7 G/DL
PROT SERPL-MCNC: 6.8 G/DL
PROT SERPL-MCNC: 6.8 G/DL
PROT UR-MCNC: 9 MG/DL
PROTEIN URINE: NEGATIVE MG/DL
PROTEINASE3 AB SER IA-ACNC: <0.2 AL
PROTEINASE3 AB SER-ACNC: NEGATIVE
PT BLD: 10.9 SEC
RBC # BLD: 4.45 M/UL
RBC # FLD: 14.4 %
SODIUM SERPL-SCNC: 142 MMOL/L
SPECIFIC GRAVITY URINE: 1.02
URATE SERPL-MCNC: 5.1 MG/DL
UROBILINOGEN URINE: 0.2 MG/DL
WBC # FLD AUTO: 5.79 K/UL

## 2025-03-10 LAB — PHOSPHOLIPASE A2 RECEPTOR ELISA: <1.8 RU/ML

## 2025-03-18 ENCOUNTER — NON-APPOINTMENT (OUTPATIENT)
Age: 55
End: 2025-03-18

## 2025-03-20 ENCOUNTER — APPOINTMENT (OUTPATIENT)
Dept: ORTHOPEDIC SURGERY | Facility: CLINIC | Age: 55
End: 2025-03-20
Payer: COMMERCIAL

## 2025-03-20 VITALS — HEIGHT: 66 IN | WEIGHT: 199 LBS | BODY MASS INDEX: 31.98 KG/M2

## 2025-03-20 DIAGNOSIS — M65.969 UNSP SYNOVITIS AND TENOSYNOVITIS, UNSPECIFIED LOWER LEG: ICD-10-CM

## 2025-03-20 DIAGNOSIS — M23.92 UNSPECIFIED INTERNAL DERANGEMENT OF LEFT KNEE: ICD-10-CM

## 2025-03-20 DIAGNOSIS — M22.42 CHONDROMALACIA PATELLAE, LEFT KNEE: ICD-10-CM

## 2025-03-20 PROCEDURE — 99213 OFFICE O/P EST LOW 20 MIN: CPT

## 2025-04-03 ENCOUNTER — NON-APPOINTMENT (OUTPATIENT)
Age: 55
End: 2025-04-03

## 2025-04-03 PROBLEM — Z91.89 AT HIGH RISK FOR BREAST CANCER: Status: ACTIVE | Noted: 2025-04-03

## 2025-04-11 ENCOUNTER — NON-APPOINTMENT (OUTPATIENT)
Age: 55
End: 2025-04-11

## 2025-05-05 ENCOUNTER — APPOINTMENT (OUTPATIENT)
Dept: OBGYN | Facility: CLINIC | Age: 55
End: 2025-05-05
Payer: COMMERCIAL

## 2025-05-05 VITALS
DIASTOLIC BLOOD PRESSURE: 76 MMHG | SYSTOLIC BLOOD PRESSURE: 122 MMHG | WEIGHT: 200 LBS | HEIGHT: 66 IN | BODY MASS INDEX: 32.14 KG/M2

## 2025-05-05 DIAGNOSIS — G47.00 INSOMNIA, UNSPECIFIED: ICD-10-CM

## 2025-05-05 DIAGNOSIS — Z78.0 ASYMPTOMATIC MENOPAUSAL STATE: ICD-10-CM

## 2025-05-05 PROCEDURE — 99213 OFFICE O/P EST LOW 20 MIN: CPT

## 2025-05-05 RX ORDER — ESTRADIOL 2 MG/1
2 TABLET ORAL DAILY
Qty: 90 | Refills: 3 | Status: ACTIVE | COMMUNITY
Start: 2025-05-05 | End: 1900-01-01

## 2025-05-12 ENCOUNTER — NON-APPOINTMENT (OUTPATIENT)
Age: 55
End: 2025-05-12

## 2025-08-04 ENCOUNTER — APPOINTMENT (OUTPATIENT)
Dept: OBGYN | Facility: CLINIC | Age: 55
End: 2025-08-04
Payer: COMMERCIAL

## 2025-08-04 VITALS
BODY MASS INDEX: 31.34 KG/M2 | WEIGHT: 195 LBS | DIASTOLIC BLOOD PRESSURE: 72 MMHG | SYSTOLIC BLOOD PRESSURE: 118 MMHG | HEIGHT: 66 IN

## 2025-08-04 DIAGNOSIS — Z78.0 ASYMPTOMATIC MENOPAUSAL STATE: ICD-10-CM

## 2025-08-04 DIAGNOSIS — Z79.890 HORMONE REPLACEMENT THERAPY: ICD-10-CM

## 2025-08-04 PROCEDURE — 99213 OFFICE O/P EST LOW 20 MIN: CPT

## 2025-08-28 ENCOUNTER — APPOINTMENT (OUTPATIENT)
Dept: OTHER | Facility: CLINIC | Age: 55
End: 2025-08-28
Payer: COMMERCIAL

## 2025-08-28 VITALS
DIASTOLIC BLOOD PRESSURE: 89 MMHG | BODY MASS INDEX: 31.82 KG/M2 | OXYGEN SATURATION: 100 % | SYSTOLIC BLOOD PRESSURE: 130 MMHG | HEIGHT: 66 IN | HEART RATE: 70 BPM | TEMPERATURE: 97.9 F | RESPIRATION RATE: 17 BRPM | WEIGHT: 198 LBS

## 2025-08-28 DIAGNOSIS — Z04.9 ENCOUNTER FOR EXAMINATION AND OBSERVATION FOR UNSPECIFIED REASON: ICD-10-CM

## 2025-08-28 PROCEDURE — 99396 PREV VISIT EST AGE 40-64: CPT | Mod: 25

## 2025-08-28 PROCEDURE — 94010 BREATHING CAPACITY TEST: CPT

## 2025-08-29 LAB
ALBUMIN SERPL ELPH-MCNC: 4.2 G/DL
ALP BLD-CCNC: 75 U/L
ALT SERPL-CCNC: 19 U/L
ANION GAP SERPL CALC-SCNC: 11 MMOL/L
APPEARANCE: CLEAR
AST SERPL-CCNC: 18 U/L
BACTERIA: NEGATIVE /HPF
BILIRUB SERPL-MCNC: 0.4 MG/DL
BILIRUBIN URINE: NEGATIVE
BLOOD URINE: ABNORMAL
BUN SERPL-MCNC: 11 MG/DL
CALCIUM SERPL-MCNC: 9.5 MG/DL
CAST: 0 /LPF
CHLORIDE SERPL-SCNC: 106 MMOL/L
CHOLEST SERPL-MCNC: 212 MG/DL
CO2 SERPL-SCNC: 24 MMOL/L
COLOR: YELLOW
CREAT SERPL-MCNC: 0.8 MG/DL
EGFRCR SERPLBLD CKD-EPI 2021: 89 ML/MIN/1.73M2
EPITHELIAL CELLS: 5 /HPF
GLUCOSE QUALITATIVE U: NEGATIVE MG/DL
GLUCOSE SERPL-MCNC: 78 MG/DL
HCT VFR BLD CALC: 39.8 %
HDLC SERPL-MCNC: 54 MG/DL
HGB BLD-MCNC: 13.5 G/DL
KETONES URINE: NEGATIVE MG/DL
LDLC SERPL-MCNC: 136 MG/DL
LEUKOCYTE ESTERASE URINE: ABNORMAL
MCHC RBC-ENTMCNC: 31.4 PG
MCHC RBC-ENTMCNC: 33.9 G/DL
MCV RBC AUTO: 92.6 FL
MICROSCOPIC-UA: NORMAL
NITRITE URINE: NEGATIVE
NONHDLC SERPL-MCNC: 159 MG/DL
PH URINE: 5.5
PLATELET # BLD AUTO: 249 K/UL
POTASSIUM SERPL-SCNC: 4.8 MMOL/L
PROT SERPL-MCNC: 7 G/DL
PROTEIN URINE: NEGATIVE MG/DL
RBC # BLD: 4.3 M/UL
RBC # FLD: 13.4 %
RED BLOOD CELLS URINE: 1 /HPF
REVIEW: NORMAL
SODIUM SERPL-SCNC: 140 MMOL/L
SPECIFIC GRAVITY URINE: 1.01
TRIGL SERPL-MCNC: 127 MG/DL
UROBILINOGEN URINE: 0.2 MG/DL
WBC # FLD AUTO: 4.96 K/UL
WHITE BLOOD CELLS URINE: 3 /HPF

## (undated) DEVICE — TUBING AIRSEAL TRI-LUMEN FILTERED

## (undated) DEVICE — SUT POLYSORB 0 30" GS-23

## (undated) DEVICE — SOL IRR POUR H2O 250ML

## (undated) DEVICE — ONETRAC LIGHTED RETRACTOR 135 X 60MM DISP

## (undated) DEVICE — ENDOCATCH 10MM SPECIMEN POUCH

## (undated) DEVICE — GLV 7 PROTEXIS (WHITE)

## (undated) DEVICE — TUBING STRYKEFLOW II SUCTION / IRRIGATOR

## (undated) DEVICE — DRAPE UNDER BUTTOCKS W FLUID CONTROL POUCH

## (undated) DEVICE — TROCAR SURGIQUEST AIRSEAL 12MMX100MM

## (undated) DEVICE — SUT VLOC 180 0 12" GS-21 GREEN

## (undated) DEVICE — UTERINE MANIPULATOR COOPER SURGICAL 5MM 33CM GREEN

## (undated) DEVICE — PREP CHLORAPREP HI-LITE ORANGE 26ML

## (undated) DEVICE — UTERINE MANIPULATOR CONMED VCARE LG 37MM

## (undated) DEVICE — DRAPE TOWEL BLUE 17" X 24"

## (undated) DEVICE — INZII RETRIEVAL SYSTEM 12/15MM

## (undated) DEVICE — DRAPE BACK TABLE COVER HEAVY DUTY 60"

## (undated) DEVICE — TROCAR SURGIQUEST AIRSEAL 8MMX100MM

## (undated) DEVICE — DRAPE 3/4 SHEET W REINFORCEMENT 56X77"

## (undated) DEVICE — SYR LUER LOK 10CC

## (undated) DEVICE — SPECIMEN CONTAINER 100ML

## (undated) DEVICE — TROCAR GELPOINT MINI ADVANCED

## (undated) DEVICE — XI DRAPE ARM

## (undated) DEVICE — DRAPE MAYO STAND 30"

## (undated) DEVICE — POSITIONER PURPLE ARM ONE STEP (LARGE)

## (undated) DEVICE — D HELP - CLEARVIEW CLEARIFY SYSTEM

## (undated) DEVICE — XI OBTURATOR BLADELESS LONG OPTICAL 8MM

## (undated) DEVICE — XI OBTURATOR ROBOTIC BLADELESS 12MM

## (undated) DEVICE — FOLEY TRAY 16FR LF URINE METER SURESTEP

## (undated) DEVICE — SOL IRR POUR H2O 1000ML

## (undated) DEVICE — POSITIONER PINK PAD PIGAZZI SYSTEM XL W ARM PROTECTOR

## (undated) DEVICE — XI SEAL UNIV 5- 8 MM

## (undated) DEVICE — PREP BETADINE KIT

## (undated) DEVICE — UTERINE MANIPULATOR CLINICAL INNOVATIONS CLEARVIEW 7CM

## (undated) DEVICE — LIGASURE IMPACT

## (undated) DEVICE — WARMING BLANKET UPPER ADULT

## (undated) DEVICE — TROCAR ETHICON ENDOPATH XCEL BLADELESS 5MM X 100MM STABILITY

## (undated) DEVICE — GLV 6.5 PROTEXIS (WHITE)

## (undated) DEVICE — GOWN LG

## (undated) DEVICE — XI VESSEL SEALER

## (undated) DEVICE — SOL INJ NS 0.9% 500ML 1-PORT

## (undated) DEVICE — TROCAR COVIDIEN VERSAPORT PLUS BLADELESS FIXATION 15MM

## (undated) DEVICE — DRAPE LIGHT HANDLE COVER (BLUE)

## (undated) DEVICE — UTERINE MANIPULATOR CONMED VCARE SM 32MM

## (undated) DEVICE — PACK GYN LAPAROSCOPY

## (undated) DEVICE — SUT BIOSYN 4-0 18" P-12

## (undated) DEVICE — POSITIONER PINK PAD PIGAZZI SYSTEM

## (undated) DEVICE — XI DRAPE COLUMN

## (undated) DEVICE — XI TIP COVER

## (undated) DEVICE — XI OBTURATOR OPTICAL BLADELESS 8MM

## (undated) DEVICE — SUT VLOC 180 0 9" GS-21 GREEN

## (undated) DEVICE — FOLEY TRAY 16FR 5CC LF UMETER CLOSED

## (undated) DEVICE — UTERINE MANIPULATOR CONMED VCARE MED 34MM

## (undated) DEVICE — APPLICATOR VISTASEAL LAP DUAL 35CM RIGID

## (undated) DEVICE — SUT POLYSORB 0 18" TIES UNDYED

## (undated) DEVICE — LUBRICATING JELLY ONESHOT 1.25OZ

## (undated) DEVICE — VENODYNE/SCD SLEEVE CALF LARGE

## (undated) DEVICE — SUT VLOC 180 0 6" GS-21 GREEN

## (undated) DEVICE — SOL IRR BAG H2O 3000ML

## (undated) DEVICE — STAPLER SKIN VISI-STAT 35 WIDE

## (undated) DEVICE — DRAPE INSTRUMENT POUCH 6.75" X 11"

## (undated) DEVICE — LUBRICANT INST ELECTROLUBE Z SOLUTION

## (undated) DEVICE — Device

## (undated) DEVICE — PACK CYSTO

## (undated) DEVICE — MARKING PEN W RULER

## (undated) DEVICE — SOL IRR POUR NS 0.9% 500ML